# Patient Record
Sex: FEMALE | Race: WHITE | Employment: FULL TIME
[De-identification: names, ages, dates, MRNs, and addresses within clinical notes are randomized per-mention and may not be internally consistent; named-entity substitution may affect disease eponyms.]

---

## 2022-05-18 PROBLEM — F32.A ANXIETY AND DEPRESSION: Status: ACTIVE | Noted: 2022-05-18

## 2022-05-18 PROBLEM — E03.9 ACQUIRED HYPOTHYROIDISM: Status: ACTIVE | Noted: 2022-05-18

## 2022-05-18 PROBLEM — F41.9 ANXIETY AND DEPRESSION: Status: ACTIVE | Noted: 2022-05-18

## 2022-05-20 DIAGNOSIS — Z85.3 HISTORY OF BREAST CANCER: Primary | ICD-10-CM

## 2022-05-26 ENCOUNTER — TELEPHONE (OUTPATIENT)
Dept: PRIMARY CARE CLINIC | Facility: CLINIC | Age: 57
End: 2022-05-26

## 2022-05-26 NOTE — TELEPHONE ENCOUNTER
Please inform patient that all of her lab work came back normal. Continue current medications as it was already sent to the pharmacy with refills. Will follow up on the scheduled date for physical. Thank you.

## 2022-07-01 NOTE — PROGRESS NOTES
[address] [place] [address] Hematology and Oncology: New Patient - Consultation    Chief Complaint   Patient presents with    New Patient     Reason for Referral: History of breast cancer  Referring Provider: Brisa Lala DO  Family History of Cancer/Hematologic Disorders: None reported  Presenting Symptoms: No relevant physical symptoms reported     History of Present Illness:  Ms. Rajat Pierce is a 61 y.o. female who presents today in referral from Dr. Tye Garza for consultation regarding breast cancer. The past medical history is significant for hypothyroidism, depression/anxiety, fibroids, melanoma in 2012, and breast cancer. She established primary care with Dr. Brisa Lala on 5/18/22 after recently moving to [address] [place] [address] from [place], [place]. She requested a referral to oncology. Review of records indicates that patient was last seen by Oncologist, Dr. Jenna Crews, at [address] [place] [address] on 12/15/21. According to Dr. Carley Padilla notes, patient was diagnosed with a left breast cancer. She had palpated a lump to the left breast about 7 years prior, states that they were just monitoring it, and felt it was nothing to worry about. 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 She underwent lumpectomy with adjuvant radiation therapy, but she declined any further treatment with recommended Tamoxifen. In 2006, she again self-palpated a lump to the right breast. A 3.95 mm breast cancer, ER/NY+, HER-2/conner - was noted on pathology. She again underwent lumpectomy with adjuvant radiation therapy and five years of Tamoxifen.      Bilateral mammogram done 10/29/2015 noted mild diffuse dense breast parenchyma bilaterally. There was no evidence of any suspicious dominant mass lesion, spiculation and or microcalcifications. The retro-areolar and axillary portions of both breasts were unremarkable.  Patient stated that she had no issues for quite some time, until 2015, when she again self-palpated a lump in her right breast. Lumpectomy was done on 12/14/2018, and pathology noted invasive ductal carcinoma, 0.6 cm, grade 2, ER/ME +, HER2 -. She opted to procced with a skin sparing bilateral mastectomy, which was done 01/21/2016. The left breast noted fibrocystic changes, negative for atypia or malignancy. The right breast noting extensive previous biopsy site and reactive changes, negative for residual invasive carcinoma. Three lymph nodes were removed from the right axilla, and all three nodes were negative for carcinoma. Staging was [id number]. Oncotype DX was not done. BRCA testing was negative.      The patient did establish care with [address] [place] [address] and [address] [place] [address] on 11/11/2016. She was restarted on Tamoxifen, which was changed to [address] in February of 2018, due to menopause status. The patient states that she was on AI therapy for about 3 months, but she was unable to handle the side effects of the medication. She did try cutting the medication dose in half, which was better, but she still opted to discontinue the medication. She does however take a hormone balancing supplement with DIM. PET/CT scan done on 11/30/2016 noted a vague area of hypermetabolism within the right breast subareolar region with subtle soft tissue fullness when compared to the contralateral side; post-surgical changes of bilateral mastectomy, bilateral axillary lymph node dissection, and bilateral breast reconstruction; no suspicious hypermetabolic lymphadenopathy of the head and neck, chest, abdomen or pelvis; and no suspicious hypermetabolic osseous lesion. Bone density was performed on 03/14/2018, noting osteopenia. Patient does take daily calcium and vitamin D, but she has declined any bisphosphonate therapy.      She was seen by her oncologist with [facility] in October of 2018.  They discussed the continuation of AI therapy, along with consideration of bisphosphonate therapy. The patient was not interested in either of those recommendations and opted not to continue her care with this group. She was looking for a provider with a more holistic approach and was referred to Dr. Shon Aquino. She was evaluated in consultation by Dr. Shon Aquino on 4/2/19 to establish care as a new patient due to a history of breast cancer. Patient was interested in moving forward with the possibility of an alternative AI and was prescribed letrozole or exemestene. During follow-up appointment with Dr. Shon Aquino on 10/16/19, patient opted to proceed with letrozole 2.5mg po daily. She was also referred to a naturopathic oncology provider. Ultimately, patient did not end up starting the letrozole. She was last seen by Dr. Shon Aquino on 11/17/20. Patient has since relocated from [place] to [place] and is seeking to establish oncology care with [facility] for her history of breast cancer. Today, she is here for consultation. During today's visit we discussed the pathophysiology of breast cancer, staging, and the importance of receptor status in terms of treatment options. We then reviewed her medical history as well as oncologic history, recent imaging and pathology in detail. We discussed next steps in management. She is here with her . Family History   Problem Relation Age of Onset    Other Neg Hx     Post-op Nausea/Vomiting Neg Hx     Delayed Awakening Neg Hx     Pseudochol.  Deficiency Neg Hx     Malig Hypertherm Neg Hx     Colon Cancer Neg Hx     Ovarian Cancer Neg Hx     Breast Cancer Neg Hx     Emergence Delirium Neg Hx     Post-op Cognitive Dysfunction Neg Hx     Dementia Mother     Cancer Maternal Grandfather         lung    Cancer Other         cervical or uterine      Social History     Socioeconomic History    Marital status: Unknown     Spouse name: None    Number of children: None    Years of education: None    Highest education level: None   Occupational History    None   Tobacco Use    Smoking status: Never Smoker    Smokeless tobacco: Never Used   Substance and Sexual Activity    Alcohol use: Yes     Alcohol/week: 4.2 standard drinks    Drug use: No    Sexual activity: None     Comment: -vasectomy   Other Topics Concern    None   Social History Narrative    None     Social Determinants of Health     Financial Resource Strain:     Difficulty of Paying Living Expenses: Not on file   Food Insecurity:     Worried About Running Out of Food in the Last Year: Not on file    Bob of Food in the Last Year: Not on file   Transportation Needs:     Lack of Transportation (Medical): Not on file    Lack of Transportation (Non-Medical): Not on file   Physical Activity:     Days of Exercise per Week: Not on file    Minutes of Exercise per Session: Not on file   Stress:     Feeling of Stress : Not on file   Social Connections:     Frequency of Communication with Friends and Family: Not on file    Frequency of Social Gatherings with Friends and Family: Not on file    Attends Yarsanism Services: Not on file    Active Member of [address] [place] [address] or Organizations: Not on file    Attends Club or Organization Meetings: Not on file    Marital Status: Not on file   Intimate Partner Violence:     Fear of Current or Ex-Partner: Not on file    Emotionally Abused: Not on file    Physically Abused: Not on file    Sexually Abused: Not on file   Housing Stability:     Unable to Pay for Housing in the Last Year: Not on file    Number of [place] in the Last Year: Not on file    Unstable Housing in the Last Year: Not on file        Review of Systems   Constitutional: Negative for appetite change, chills, diaphoresis, fatigue, fever and unexpected weight change. HENT:   Negative for hearing loss, mouth sores, nosebleeds, sore throat, trouble swallowing and voice change. Eyes: Negative for icterus.    Respiratory: Negative for chest tightness, hemoptysis, shortness of breath and wheezing. Cardiovascular: Negative for chest pain, leg swelling and palpitations. Gastrointestinal: Negative for abdominal distention, abdominal pain, blood in stool, constipation, diarrhea, nausea and vomiting. Endocrine: Negative for hot flashes. Genitourinary: Negative for difficulty urinating, frequency, vaginal bleeding and vaginal discharge. Musculoskeletal: Negative for arthralgias, flank pain, gait problem and myalgias. Skin: Negative for itching, rash and wound. Neurological: Negative for dizziness, extremity weakness, gait problem, headaches and numbness. Psychiatric/Behavioral: Negative for confusion and depression. The patient is not nervous/anxious. No Known Allergies  Past Medical History:   Diagnosis Date    [facility]     Cancer ([facility] 2012    melanoma/early stage--back    Cancer [facility]) 2004 - 2006    breast cancer    Fibroids     Radiation therapy complication     Thyroid disease      Past Surgical History:   Procedure Laterality Date    BREAST LUMPECTOMY Bilateral     2004 AND 2006    BREAST RECONSTRUCTION  2016    bilateral breast reconstruction with implants     BREAST SURGERY  2004    biopsy    BREAST SURGERY  2004 and  2006    lumpectomy x 2    COLPOSCOPY  12/01/08    GYN  12/27/07    endometrial bx    GYN  01/21/10     D&C, cervical bx; visually directed bx's of endometrium    MASTECTOMY, BILATERAL  2016     Current Outpatient Medications   Medication Sig Dispense Refill    NP THYROID 60 MG tablet TAKE 1 TABLET BY MOUTH EVERY MORNING      venlafaxine (EFFEXOR) 37.5 MG tablet TAKE 1 TABLET BY MOUTH DAILY (AFTER BREAKFAST) FOR 90 DAYS. INDICATIONS: MAJOR DEPRESSIVE DISORDER      letrozole (FEMARA) 2.5 MG tablet Take 1 tablet by mouth daily 90 tablet 3     No current facility-administered medications for this visit. No flowsheet data found.     OBJECTIVE:  /66   Pulse (!) 120   Temp 98.4 °F (36.9 °C) (Oral)   Resp 16   Ht 5' 4.5\" (1.638 m)   Wt 116 lb 9.6 oz (52.9 kg)   SpO2 100%   BMI 19.71 kg/m²       ECOG PERFORMANCE STATUS - 0-Fully active, able to carry on all pre-disease performance without restriction. Pain - 0 - No pain/10. None/Minimal pain - not affecting QOL     Fatigue - No flowsheet data found. Distress - No flowsheet data found. Physical Exam  Vitals reviewed. Exam conducted with a chaperone present. Constitutional:       General: She is not in acute distress. Appearance: Normal appearance. She is normal weight. She is not ill-appearing or toxic-appearing. HENT:      Head: Normocephalic and atraumatic. Nose: Nose normal. No congestion. Mouth/Throat:      Mouth: Mucous membranes are moist.   Eyes:      General: No scleral icterus. Extraocular Movements: Extraocular movements intact. Conjunctiva/sclera: Conjunctivae normal.      Pupils: Pupils are equal, round, and reactive to light. Cardiovascular:      Rate and Rhythm: Normal rate and regular rhythm. Heart sounds: No murmur heard. Pulmonary:      Effort: Pulmonary effort is normal. No respiratory distress. Breath sounds: Normal breath sounds. No wheezing or rales. Chest:   Breasts:      Right: No axillary adenopathy or supraclavicular adenopathy. Left: No axillary adenopathy or supraclavicular adenopathy. Comments: S/p bilateral mastectomies with recon   No mass/lump   No axillary LAD bilaterally   Abdominal:      General: There is no distension. Palpations: Abdomen is soft. Tenderness: There is no abdominal tenderness. Musculoskeletal:         General: Normal range of motion. Cervical back: Normal range of motion. Right lower leg: No edema. Left lower leg: No edema. Lymphadenopathy:      Cervical: No cervical adenopathy. Upper Body:      Right upper body: No supraclavicular or axillary adenopathy.       Left upper body: No supraclavicular or axillary adenopathy. Skin:     General: Skin is warm and dry. Coloration: Skin is not jaundiced or pale. Findings: No rash. Neurological:      General: No focal deficit present. Mental Status: She is alert and oriented to person, place, and time. Gait: Gait normal.   Psychiatric:         Behavior: Behavior normal.         Thought Content: Thought content normal.          Labs:  No results found for this or any previous visit (from the past 168 hour(s)). Imaging: reviewed     [facility] records sent to scanning     ASSESSMENT:     Diagnosis Orders   1. Aromatase inhibitor use  DEXA BONE DENSITY AXIAL SKELETON   2. History of breast cancer  letrozole (FEMARA) 2.5 MG tablet    CBC with Auto Differential    Comprehensive Metabolic Panel       Ms. Heladio Gill is here for evaluation of breast cancer. 1. 2004 - 1.4cm, IDC, tubular type, YC38/AF74 pos, Her2 neg breast cancer - s/p lumpectomy, SLNx neg for malignancy, s/p XRT; rec Bae but pt elected not to proceed   2006 - right breast IDC 3.95mm, grade 2, ER3%/PR21%, her2 neg - [id number] - s/p lumpectomy and XRT; BRCA1/2 testing reportedly negative   2015 - IDC, 6mm, %/PR98%, Fli8Evd neg - pT1bN0 (0/3)Mx - OncotypeDx not done; s/p bilateral nipple sparing mastectomies   11/11/16 - started Bae   1/2018 - stopped Bae and started anastrozole   4/2018 - did not tolerate AI, declined alternate - decided on half dose anastrozole due to high risk   7/2022 - meeting me for 1st time; has not been on endo therapy     - During today's visit we discussed the pathophysiology of breast cancer, staging, and the importance of receptor status in terms of treatment options. We then reviewed her medical history as well as oncologic history, recent imaging and pathology in detail.  - discussed recs of endo therapy.     - AI can cause hot flashes, HTN, angina, swelling, fatigue, bone thinning leading to osteoporosis/fractures, joint stiffness, N/V, hair thinning, weight changes, thrombosis and worsening depression to name a few. Pt willing to try letrozole. - DEXA q2 years; discussed role of wt bearing exercise and bone health; pt not interested in bisphosphonate therapy - but we discussed options and indications today  - supplements reviewed - advised for pt to avoid current hormone balance supplement; [facility] herbs website shared with pt     RESUSCITATION DIRECTIVES/HOSPICE CARE: Full Support    RTC ~1mo for labs if chooses to take AI or sooner as needed   Can opt to come back for labs alone in 2mo or 6 mo FU with labs   [60min encounter - chart review, imaging review, consultation discussion, coordination of care and charting]    MDM  Number of Diagnoses or Management Options  Aromatase inhibitor use: new, no workup  History of breast cancer: established, improving     Amount and/or Complexity of Data Reviewed  Clinical lab tests: reviewed and ordered  Tests in the radiology section of CPT®: ordered and reviewed  Tests in the medicine section of CPT®: ordered  Obtain history from someone other than the patient: yes  Review and summarize past medical records: yes  Independent visualization of images, tracings, or specimens: yes    Risk of Complications, Morbidity, and/or Mortality  Presenting problems: moderate  Diagnostic procedures: moderate  Management options: high        Lab studies and imaging studies were personally reviewed. Pertinent old records were reviewed. All questions were asked and answered to the best of my ability. The patient verbalized understanding and agrees with the plan above. Documentation was sent to Dr. Charan Ng for continuation of care.   Thank you, Dr. Charan Ng, for the courtesy of this referral.        [facility], [address]  [address] [facility] [place] [address], [address] [place] [address]  Office : [phone]  Fax : [phone]

## 2022-07-06 ENCOUNTER — OFFICE VISIT (OUTPATIENT)
Dept: ONCOLOGY | Age: 57
End: 2022-07-06
Payer: COMMERCIAL

## 2022-07-06 VITALS
SYSTOLIC BLOOD PRESSURE: 113 MMHG | HEART RATE: 120 BPM | BODY MASS INDEX: 19.43 KG/M2 | RESPIRATION RATE: 16 BRPM | HEIGHT: 65 IN | OXYGEN SATURATION: 100 % | TEMPERATURE: 98.4 F | WEIGHT: 116.6 LBS | DIASTOLIC BLOOD PRESSURE: 66 MMHG

## 2022-07-06 DIAGNOSIS — Z79.811 AROMATASE INHIBITOR USE: Primary | ICD-10-CM

## 2022-07-06 DIAGNOSIS — Z85.3 HISTORY OF BREAST CANCER: ICD-10-CM

## 2022-07-06 PROCEDURE — 99205 OFFICE O/P NEW HI 60 MIN: CPT | Performed by: INTERNAL MEDICINE

## 2022-07-06 PROCEDURE — G8427 DOCREV CUR MEDS BY ELIG CLIN: HCPCS | Performed by: INTERNAL MEDICINE

## 2022-07-06 PROCEDURE — G8420 CALC BMI NORM PARAMETERS: HCPCS | Performed by: INTERNAL MEDICINE

## 2022-07-06 PROCEDURE — 3017F COLORECTAL CA SCREEN DOC REV: CPT | Performed by: INTERNAL MEDICINE

## 2022-07-06 PROCEDURE — 1036F TOBACCO NON-USER: CPT | Performed by: INTERNAL MEDICINE

## 2022-07-06 RX ORDER — LEVOTHYROXINE, LIOTHYRONINE 38; 9 UG/1; UG/1
TABLET ORAL
COMMUNITY
Start: 2022-05-29

## 2022-07-06 RX ORDER — LETROZOLE 2.5 MG/1
2.5 TABLET, FILM COATED ORAL DAILY
Qty: 90 TABLET | Refills: 3 | Status: SHIPPED | OUTPATIENT
Start: 2022-07-06

## 2022-07-06 RX ORDER — VENLAFAXINE 37.5 MG/1
TABLET ORAL
COMMUNITY
Start: 2022-05-18 | End: 2022-07-29

## 2022-07-06 ASSESSMENT — PATIENT HEALTH QUESTIONNAIRE - PHQ9
3. TROUBLE FALLING OR STAYING ASLEEP: 0
SUM OF ALL RESPONSES TO PHQ QUESTIONS 1-9: 0
2. FEELING DOWN, DEPRESSED OR HOPELESS: 0
9. THOUGHTS THAT YOU WOULD BE BETTER OFF DEAD, OR OF HURTING YOURSELF: 0
SUM OF ALL RESPONSES TO PHQ QUESTIONS 1-9: 0
SUM OF ALL RESPONSES TO PHQ9 QUESTIONS 1 & 2: 0
8. MOVING OR SPEAKING SO SLOWLY THAT OTHER PEOPLE COULD HAVE NOTICED. OR THE OPPOSITE, BEING SO FIGETY OR RESTLESS THAT YOU HAVE BEEN MOVING AROUND A LOT MORE THAN USUAL: 0
SUM OF ALL RESPONSES TO PHQ QUESTIONS 1-9: 0
6. FEELING BAD ABOUT YOURSELF - OR THAT YOU ARE A FAILURE OR HAVE LET YOURSELF OR YOUR FAMILY DOWN: 0
1. LITTLE INTEREST OR PLEASURE IN DOING THINGS: 0
4. FEELING TIRED OR HAVING LITTLE ENERGY: 0
SUM OF ALL RESPONSES TO PHQ QUESTIONS 1-9: 0
5. POOR APPETITE OR OVEREATING: 0
7. TROUBLE CONCENTRATING ON THINGS, SUCH AS READING THE NEWSPAPER OR WATCHING TELEVISION: 0

## 2022-07-06 NOTE — PATIENT INSTRUCTIONS
Patient Instructions from Today's Visit    Reason for Visit:  New patient for history of  Breast cancer. Diagnosis Information:  https://www.[employer]/. net/about-us/asco-answers-patient-education-materials/tkpw-wctcvpj-dmhi-sheets  Patient was educated and given handouts published by ASCO entitled ASCO Answers Fact Sheets about their diagnosis of breast cancer during todays office visit. Plan:  Discussed breast cancer history and antihormonal therapy. Due for bone density scan. Can call [phone] to schedule at your convenience. Will start letrozole and see how you tolerate. Discussed blood pressure monitoring, let us know if consistently elevated. Follow Up:  Labs only in a couple months. Office visit 2 months with labs. Can change to 6 months if you do not take the letrozole. Recent Lab Results:  n/a      Treatment Summary has been discussed and given to patient: n/a        -------------------------------------------------------------------------------------------------------------------  Please call our office at [phone] if you have any  of the following symptoms:   · Fever of 100.5 or greater  · Chills  · Shortness of breath  · Swelling or pain in one leg    After office hours an answering service is available and will contact a provider for emergencies or if you are experiencing any of the above symptoms.  Patient did express an interest in My Chart. My Chart log in information explained on the after visit summary printout at the [facility] Kyleigh [address] desk. Araceli Conde RN            ASCO ANSWERS is a collection of oncologist-approved patient education materials developed by the [employer] of Clinical Oncology (ASCO) for people with cancer and their caregivers. Breast Cancer    What is breast cancer? Breast cancer begins when healthy breast cells change and grow out of control, usually forming a mass called a tumor.  Breast cancer is the most common type of cancer diagnosed in women in the [facility] (excluding skin cancer). What are the parts of the breast?   Most of the breast is fatty tissue. However, it also contains a network of lobes that are made up of tiny, tube-like structures called lobules that contain milk glands. Tiny ducts connect the glands, lobules, and lobes, and carry milk from the lobes to the nipple. Most breast cancers begin in the cells lining the milk ducts and are called ductal carcinomas. The second most common type starts in the lobules and is called lobular carcinoma. What does stage mean? The stage is a way of describing where the cancer is located, how much the cancer has grown, and if or where it has spread. There are 5 stages for breast cancer: stage 0 (zero), which is called noninvasive cancer or ductal carcinoma in situ (DCIS), and stages I through IV (1 through 4). Descriptions and illustrations of these stages are available at www.cancer. net/breast.     How is breast cancer treated? The biology and behavior of a breast cancer affect the treatment plan, and every persons cancer is different. Doctors consider many factors when recommending a treatment plan, including the cancers stage; the tumors human epidermal growth factor receptor 2 (HER2) status and the hormone receptor status, which includes estrogen receptors (ER) and progesterone receptors (UT); the presence of known mutations (changes) in breast cancer genes; and the womans age, general health, and whether she has experienced menopause. For earlier stages of cancer, surgery to remove the tumor and nearby lymph nodes usually is the first treatment. Additional treatment with chemotherapy, radiation therapy, hormonal therapy, or targeted therapy is usually given after surgery to lower the risk of the cancer returning. These treatments may also be given before surgery to shrink the size of the tumor.  The treatment of cancer that has spread or come back after treatment depends on many factors. It can include the therapies listed above used in a different combination or at a different pace. When making treatment decisions, women may also consider a clinical trial; talk with your doctor about all treatment options. The side effects of breast cancer treatment can be reduced or managed with a variety of medications and the help of your health care team. This is called palliative care and is an important part of the overall treatment plan. How can I cope with breast cancer? Absorbing the news of a cancer diagnosis and communicating with your health care team are key parts of the coping process. Seeking support, organizing your health information, making sure all of your questions are answered, and participating in the decision-making process are other steps. Talk with your health care team about any concerns. Understanding your emotions and those of people close to you can be helpful in managing the diagnosis, treatment, and healing process. [address]. © 2004 AMERICAN SOCIETY OF CLINICAL ONCOLOGY. MADE AVAILABLE THROUGH   Questions to ask the health care team   Regular communication is important in making informed decisions about your health care. Consider asking the following questions of your health care team:    What type of breast cancer do I have? Can you explain my pathology report (laboratory test results) to me? What stage is the breast cancer? What does this mean? What is the ER/AR status of the tumor? The HER2 status? What does this                       mean? Would you explain my treatment options? What clinical trials are available for me? Where are they located, and how do I                 find out more about them? What treatment plan do you recommend? Why? Should treatment before surgery be considered? What is the goal of each treatment?  Is it to eliminate the cancer, help me feel better, or both? Who will be part of my treatment team, and what does each member do? How will this treatment affect my daily life? Will I be able to work, exercise, and                perform my usual activities? Will this treatment affect my ability to become pregnant or have children? What                can be done to preserve my fertility? What long-term side effects are associated with my cancer treatment? If Im worried about managing the costs of cancer care, who can help me? Where can I find emotional support for me and my family? Whom should I call with questions or problems? Is there anything else I should be asking? Find more questions to ask the health care team at [address]. net/breast and www.cancer. net/metastaticbreast. For a digital list of questions, download Cancer. Nets free mobile jewell at www.cancer. net/jewell. The ideas and opinions expressed here do not necessarily reflect the opinions of the American Society of Clinical Oncology (ASCO) or [employer]. The information in this fact sheet is not intended as medical or legal advice, or as a substitute for consultation with a physician or other licensed health care provider. Patients with health care-related questions should call or see their physician or other health care provider promptly and should not disregard professional medical advice, or delay seeking it, because of information encountered here. The mention of any product, service, or treatment in this fact sheet should not be construed as an ASCO endorsement. ASCO is not responsible for any injury or damage to persons or property arising out of or related to any use of ASCOs patient education materials, or to any errors or omissions. To order more printed copies, please call [phone] or visit www.cancer. net/estore.     TERMS TO KNOW     Benign: A growth that is not cancerous     Biopsy: Removal of a small tissue sample that is examined under a microscope to check for cancer cells     Chemotherapy: The use of drugs to destroy cancer cells     DCIS: Ductal carcinoma in situ; cancer that has not spread past the ducts and is not invasive     Lymph node: A tiny, bean-shaped organ that fights infection     Lumpectomy: The surgical removal of the tumor and an area of healthy tissue around the tumor     Malignant: A cancerous growth or mass     Mastectomy: Surgical removal of the entire breast     Metastasis: The spread of cancer to another part of the body, usually to another organ     Oncologist: A doctor who specializes in treating cancer     Radiation therapy: The use of high-energy x-rays to destroy cancer cells     Tumor: An abnormal growth of body tissue     [address] [facility], [address] [facility], [address] [facility]  Toll Free: [phone]  Phone: [phone] www. [employer]  www.[employer]. [employer] © 2017 American Society of Clinical Oncology. For permissions information, contact [email or web address]   [email or web address]             letrozole  Pronunciation: LET qing zol  Brand: Femara  What is the most important information I should know about letrozole? You should not use letrozole if you are pregnant. What is letrozole? Letrozole lowers estrogen levels in postmenopausal women, which may slow thegrowth of certain types of breast tumors that need estrogen to grow in the body. Letrozole is used to treat breast cancer in postmenopausal women. It is oftengiven to women who have been taking tamoxifen (Nolvadex, Soltamox) for 5 years. Letrozole may also be used for purposes not listed in this medication guide. What should I discuss with my healthcare provider before taking letrozole? You should not use letrozole if you are allergic to it. This medicine is for use only in women who can no longer get pregnant. Letrozole can harm an unborn baby. Do not use if you are pregnant.  Use effective birth control if you are not past menopause. Keep using birth control for at least 3 weeks after your last dose of letrozole. Tell yourdoctor if you think you may be pregnant. Tell your doctor if you have ever had:   liver disease (especially cirrhosis);   osteoporosis, osteopenia (low bone mineral density);   high cholesterol; or   if you also take tamoxifen. You should not breastfeed while you are using letrozole and for at least 3weeks after your last dose. How should I take letrozole? Follow all directions on your prescription label and read all medication guidesor instruction sheets. Use the medicine exactly as directed. You may take letrozole with or without food. You will need frequent medical tests, and your bone mineral density may alsoneed to be checked. Store at room temperature away from moisture and heat. What happens if I miss a dose? Take the medicine as soon as you can, but skip the missed dose if it is almost time for your next dose. Do not take two doses at one time. What happens if I overdose? Seek emergency medical attention or call the Poison Help line at 1-300.331.1752. What should I avoid while taking letrozole? Avoid driving or hazardous activity until you know how this medicine willaffect you. Your reactions could be impaired. What are the possible side effects of letrozole? Get emergency medical help if you have signs of an allergic reaction: hives; difficult breathing; swelling of your face, lips, tongue, or throat. Common side effects may include:   hot flashes, warmth or redness in your face or chest;   headache, dizziness, weakness;   bone pain, muscle or joint pain;   swelling, weight gain;   increased sweating; or   increased cholesterol in your blood. This is not a complete list of side effects and others may occur. Call your doctor for medical advice about side effects. You may report side effects toFDA at [phone]-4077.   What other drugs will affect letrozole? Other drugs may affect letrozole, including prescription and over-the-counter medicines, vitamins, and herbal products. Tell your doctor about all yourcurrent medicines and any medicine you start or stop using. Where can I get more information? Your pharmacist can provide more information about letrozole. Remember, keep this and all other medicines out of the reach of children, never share your medicines with others, and use this medication only for the indication prescribed. Every effort has been made to ensure that the information provided by [address] is accurate, up-to-date, and complete, but no guarantee is made to that effect. Drug information contained herein may be time sensitive. [facility] information has been compiled for use by healthcare practitioners and consumers in the [place] and therefore [facility] does not warrant that uses outside of the [place] are appropriate, unless specifically indicated otherwise. [facility]'s drug information does not endorse drugs, diagnose patients or recommend therapy. [facility] drug information is an informational resource designed to assist licensed healthcare practitioners in caring for their patients and/or to serve consumers viewing this service as a supplement to, and not a substitute for, the expertise, skill, knowledge and judgment of healthcare practitioners. The absence of a warning for a given drug or drug combination in no way should be construed to indicate that the drug or drug combination is safe, effective or appropriate for any given patient. [facility] does not assume any responsibility for any aspect of healthcare administered with the aid of information [facility] provides. The information contained herein is not intended to cover all possible uses, directions, precautions, warnings, drug interactions, allergic reactions, or adverse effects.  If you have questions about the drugs you are taking, check with yourdoctor, nurse or pharmacist.  Copyright 0254-1980 [facility] Version: 8.02. Revision date: 5/14/2020. Care instructions adapted under license by [facility] ([facility]). If you have questions about a medical condition or this instruction, always ask your healthcare professional. [address] any warranty or liability for your use of this information.

## 2022-07-07 ASSESSMENT — ENCOUNTER SYMPTOMS
WHEEZING: 0
TROUBLE SWALLOWING: 0
SORE THROAT: 0
CONSTIPATION: 0
HEMOPTYSIS: 0
VOMITING: 0
BLOOD IN STOOL: 0
SCLERAL ICTERUS: 0
DIARRHEA: 0
NAUSEA: 0
ABDOMINAL PAIN: 0
SHORTNESS OF BREATH: 0
ABDOMINAL DISTENTION: 0
CHEST TIGHTNESS: 0
VOICE CHANGE: 0

## 2022-07-18 ENCOUNTER — PATIENT MESSAGE (OUTPATIENT)
Dept: PRIMARY CARE CLINIC | Facility: CLINIC | Age: 57
End: 2022-07-18

## 2022-07-28 DIAGNOSIS — F41.9 ANXIETY AND DEPRESSION: Primary | ICD-10-CM

## 2022-07-28 DIAGNOSIS — F32.A ANXIETY AND DEPRESSION: Primary | ICD-10-CM

## 2022-07-28 RX ORDER — VENLAFAXINE HYDROCHLORIDE 37.5 MG/1
37.5 CAPSULE, EXTENDED RELEASE ORAL DAILY
Qty: 30 CAPSULE | Refills: 0 | Status: SHIPPED | OUTPATIENT
Start: 2022-07-28 | End: 2022-07-29 | Stop reason: SDUPTHER

## 2022-07-28 NOTE — TELEPHONE ENCOUNTER
From: Breanne Das  To: Dr. Genaro Gaffney: 7/18/2022 9:14 AM EDT  Subject: Venlafaxime Capsules instead of Tablets    Good Morning Dr. FRANCO,    Please modify my prescription:   I need the Venlafaxine Hydrochloride Extended-release Capsules 37.5mg instead of the Venlafaxine HCL 37.5 mg tablets (that you prescribed. I have been taking the Capsules for over 10 years and would not like to switch to a different form of pill. My pharmacy is [facility][1]. Thank you Dr. Yuki Castro. Have a blest day.  Vernida Pellet

## 2022-07-29 DIAGNOSIS — F32.A ANXIETY AND DEPRESSION: ICD-10-CM

## 2022-07-29 DIAGNOSIS — F41.9 ANXIETY AND DEPRESSION: ICD-10-CM

## 2022-07-29 RX ORDER — VENLAFAXINE HYDROCHLORIDE 37.5 MG/1
37.5 CAPSULE, EXTENDED RELEASE ORAL DAILY
Qty: 90 CAPSULE | Refills: 2 | Status: SHIPPED | OUTPATIENT
Start: 2022-07-29 | End: 2022-11-02

## 2022-08-04 NOTE — TELEPHONE ENCOUNTER
Please review patient's new message from yesterday regarding RX for Rizatriptan 10 mg, please advise. Thank you!

## 2022-08-05 RX ORDER — RIZATRIPTAN BENZOATE 10 MG/1
10 TABLET ORAL
Qty: 9 TABLET | Refills: 3 | Status: SHIPPED | OUTPATIENT
Start: 2022-08-05 | End: 2022-11-02

## 2022-08-05 NOTE — TELEPHONE ENCOUNTER
I have sent the rx and only 9 pills can be filled a month. It is not recommended to use more than that, and it is once a day as needed. If headaches are getting worse and medication is not helping, then needs to be seen in office. Please inform patient. Thank you.

## 2022-08-29 ENCOUNTER — TELEPHONE (OUTPATIENT)
Dept: ONCOLOGY | Age: 57
End: 2022-08-29

## 2022-09-27 ENCOUNTER — HOSPITAL ENCOUNTER (OUTPATIENT)
Dept: MAMMOGRAPHY | Age: 57
Discharge: HOME | End: 2022-09-30
Payer: COMMERCIAL

## 2022-09-27 DIAGNOSIS — Z79.811 AROMATASE INHIBITOR USE: ICD-10-CM

## 2022-09-27 PROCEDURE — 77080 DXA BONE DENSITY AXIAL: CPT

## 2022-10-31 ASSESSMENT — ENCOUNTER SYMPTOMS
WHEEZING: 0
TROUBLE SWALLOWING: 0
CONSTIPATION: 0
ABDOMINAL DISTENTION: 0
DIARRHEA: 0
VOICE CHANGE: 0
CHEST TIGHTNESS: 0
BLOOD IN STOOL: 0
SORE THROAT: 0
NAUSEA: 0
SHORTNESS OF BREATH: 0
ABDOMINAL PAIN: 0
HEMOPTYSIS: 0
VOMITING: 0
SCLERAL ICTERUS: 0

## 2022-10-31 NOTE — PROGRESS NOTES
[facility] Hematology and Oncology: Established patient - follow up     Chief Complaint   Patient presents with    Follow-up     Reason for Referral: History of breast cancer  Referring Provider: Migdalia Dodd DO  Family History of Cancer/Hematologic Disorders: None reported  Presenting Symptoms: No relevant physical symptoms reported     History of Present Illness:  Ms. Jose Juan Dowd is a 61 y.o. female who presents today in referral from Dr. Tiara Cortez for consultation regarding breast cancer. The past medical history is significant for hypothyroidism, depression/anxiety, fibroids, melanoma in 2012, and breast cancer. She established primary care with Dr. Migdalia Dodd on 5/18/22 after recently moving to [place] from [place], [place]. She requested a referral to oncology. Review of records indicates that patient was last seen by Oncologist, Dr. Charlie Rodriguez, at [address] [place] [address] on 12/15/21. According to Dr. Matthew Mancilla notes, patient was diagnosed with a left breast cancer. She had palpated a lump to the left breast about 7 years prior, states that they were just monitoring it, and felt it was nothing to worry about. 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 She underwent lumpectomy with adjuvant radiation therapy, but she declined any further treatment with recommended Tamoxifen. In 2006, she again self-palpated a lump to the right breast. A 3.95 mm breast cancer, ER/CA+, HER-2/conner - was noted on pathology. She again underwent lumpectomy with adjuvant radiation therapy and five years of Tamoxifen. Bilateral mammogram done 10/29/2015 noted mild diffuse dense breast parenchyma bilaterally. There was no evidence of any suspicious dominant mass lesion, spiculation and or microcalcifications. The retro-areolar and axillary portions of both breasts were unremarkable.  Patient stated that she had no issues for quite some time, until 2015, when she again self-palpated a lump in her right breast. Lumpectomy was done on 12/14/2018, and pathology noted invasive ductal carcinoma, 0.6 cm, grade 2, ER/NC +, HER2 -. She opted to procced with a skin sparing bilateral mastectomy, which was done 01/21/2016. The left breast noted fibrocystic changes, negative for atypia or malignancy. The right breast noting extensive previous biopsy site and reactive changes, negative for residual invasive carcinoma. Three lymph nodes were removed from the right axilla, and all three nodes were negative for carcinoma. Staging was [id number]. Oncotype DX was not done. BRCA testing was negative. The patient did establish care with [facility] and [address] [place] [address] on 11/11/2016. She was restarted on Tamoxifen, which was changed to [address] in February of 2018, due to menopause status. The patient states that she was on AI therapy for about 3 months, but she was unable to handle the side effects of the medication. She did try cutting the medication dose in half, which was better, but she still opted to discontinue the medication. She does however take a hormone balancing supplement with DIM. PET/CT scan done on 11/30/2016 noted a vague area of hypermetabolism within the right breast subareolar region with subtle soft tissue fullness when compared to the contralateral side; post-surgical changes of bilateral mastectomy, bilateral axillary lymph node dissection, and bilateral breast reconstruction; no suspicious hypermetabolic lymphadenopathy of the head and neck, chest, abdomen or pelvis; and no suspicious hypermetabolic osseous lesion. Bone density was performed on 03/14/2018, noting osteopenia. Patient does take daily calcium and vitamin D, but she has declined any bisphosphonate therapy. She was seen by her oncologist with [facility] in October of 2018.  They discussed the continuation of AI therapy, along with consideration of bisphosphonate therapy. The patient was not interested in either of those recommendations and opted not to continue her care with this group. She was looking for a provider with a more holistic approach and was referred to Dr. Nicolas Randolph. She was evaluated in consultation by Dr. Nicolas Randolph on 4/2/19 to establish care as a new patient due to a history of breast cancer. Patient was interested in moving forward with the possibility of an alternative AI and was prescribed letrozole or exemestene. During follow-up appointment with Dr. Nicolas Randolph on 10/16/19, patient opted to proceed with letrozole 2.5mg po daily. She was also referred to a naturopathic oncology provider. Ultimately, patient did not end up starting the letrozole. She was last seen by Dr. Nicolas Randolph on 11/17/20. Patient has since relocated from [place] to [address] [place] [address] and is seeking to establish oncology care with [facility] for her history of breast cancer. During consultation, we discussed the pathophysiology of breast cancer, staging, and the importance of receptor status in terms of treatment options. We then reviewed her medical history as well as oncologic history, recent imaging and pathology in detail. We discussed next steps in management. She is here with her . Today, she is here for Fu. Tolerating letrozole very well. She started it after bone density so has been on it for about a month. Bone density reviewed and she will remain on vitamin D/calcium. For quite some time, she has been experiencing vaginal dryness and we reviewed different ways to ameliorate the symptom. She will start Reveree vaginal moisturizer. We discussed the role of vaginal dilation and I provided her resources to investigate this further. Additional tips reviewed. Labs reviewed and look great. Of note, her sister who is older than 61 was recently diagnosed with advanced ovarian cancer.   Patient had prior genetic testing and was told she was BRCA negative. We will send a note to Litzy Guzman to determine if patient meets criteria for additional genetic testing. Family History   Problem Relation Age of Onset    Other Neg Hx     Post-op Nausea/Vomiting Neg Hx     Delayed Awakening Neg Hx     Pseudochol. Deficiency Neg Hx     Malig Hypertherm Neg Hx     Colon Cancer Neg Hx     Ovarian Cancer Neg Hx     Breast Cancer Neg Hx     Emergence Delirium Neg Hx     Post-op Cognitive Dysfunction Neg Hx     Dementia Mother     Cancer Maternal Grandfather         lung    Cancer Other         cervical or uterine      Social History     Socioeconomic History    Marital status: Unknown     Spouse name: None    Number of children: None    Years of education: None    Highest education level: None   Tobacco Use    Smoking status: Never    Smokeless tobacco: Never   Substance and Sexual Activity    Alcohol use: Yes     Alcohol/week: 4.2 standard drinks    Drug use: No        Review of Systems   Constitutional:  Negative for appetite change, chills, diaphoresis, fatigue, fever and unexpected weight change. HENT:   Negative for hearing loss, mouth sores, nosebleeds, sore throat, trouble swallowing and voice change. Eyes:  Negative for icterus. Respiratory:  Negative for chest tightness, hemoptysis, shortness of breath and wheezing. Cardiovascular:  Negative for chest pain, leg swelling and palpitations. Gastrointestinal:  Negative for abdominal distention, abdominal pain, blood in stool, constipation, diarrhea, nausea and vomiting. Endocrine: Negative for hot flashes. Genitourinary:  Positive for dyspareunia. Negative for difficulty urinating, frequency, vaginal bleeding and vaginal discharge. Musculoskeletal:  Positive for arthralgias. Negative for flank pain, gait problem and myalgias. Skin:  Negative for itching, rash and wound. Neurological:  Negative for dizziness, extremity weakness, gait problem, headaches and numbness. Psychiatric/Behavioral:  Negative for confusion and depression. The patient is not nervous/anxious. No Known Allergies  Past Medical History:   Diagnosis Date    Breast cancer ([facility] [address]     Cancer ([facility] [address] 2012    melanoma/early stage--back    Cancer [facility]) 2004 - 2006    breast cancer    Fibroids     Radiation therapy complication     Thyroid disease      Past Surgical History:   Procedure Laterality Date    BREAST LUMPECTOMY Bilateral     2004 AND 2006    BREAST RECONSTRUCTION  2016    bilateral breast reconstruction with implants     BREAST SURGERY  2004    biopsy    BREAST SURGERY  2004 and  2006    lumpectomy x 2    COLPOSCOPY  12/01/08    GYN  12/27/07    endometrial bx    GYN  01/21/10     D&C, cervical bx; visually directed bx's of endometrium    MASTECTOMY, BILATERAL  2016     Current Outpatient Medications   Medication Sig Dispense Refill    rizatriptan (MAXALT) 10 MG tablet Take 1 tablet by mouth once as needed for Migraine May repeat in 2 hours if needed 9 tablet 3    venlafaxine (EFFEXOR XR) 37.5 MG extended release capsule Take 1 capsule by mouth in the morning. 90 capsule 2    NP THYROID 60 MG tablet TAKE 1 TABLET BY MOUTH EVERY MORNING      letrozole (FEMARA) 2.5 MG tablet Take 1 tablet by mouth daily 90 tablet 3     No current facility-administered medications for this visit. No flowsheet data found. OBJECTIVE:  /80 (Site: Right Upper Arm, Position: Standing)   Pulse (!) 105   Temp 98.4 °F (36.9 °C)   Resp 12   Ht 5' 4.5\" (1.638 m)   Wt 117 lb 9.6 oz (53.3 kg)   SpO2 99%   BMI 19.87 kg/m²       ECOG PERFORMANCE STATUS - 0-Fully active, able to carry on all pre-disease performance without restriction. Pain - 0 - No pain/10. None/Minimal pain - not affecting QOL     Fatigue - No flowsheet data found. Distress - No flowsheet data found. Physical Exam  Vitals reviewed. Exam conducted with a chaperone present.    Constitutional:       General: She is not in acute distress. Appearance: Normal appearance. She is normal weight. She is not ill-appearing or toxic-appearing. HENT:      Head: Normocephalic and atraumatic. Nose: Nose normal. No congestion. Mouth/Throat:      Mouth: Mucous membranes are moist.   Eyes:      General: No scleral icterus. Extraocular Movements: Extraocular movements intact. Conjunctiva/sclera: Conjunctivae normal.      Pupils: Pupils are equal, round, and reactive to light. Cardiovascular:      Rate and Rhythm: Normal rate and regular rhythm. Heart sounds: No murmur heard. Pulmonary:      Effort: Pulmonary effort is normal. No respiratory distress. Breath sounds: Normal breath sounds. No wheezing or rales. Chest:          Comments: S/p bilateral mastectomies with recon   No mass/lump   No axillary LAD bilaterally   Abdominal:      General: There is no distension. Palpations: Abdomen is soft. Tenderness: There is no abdominal tenderness. Musculoskeletal:         General: Normal range of motion. Cervical back: Normal range of motion. Right lower leg: No edema. Left lower leg: No edema. Lymphadenopathy:      Cervical: No cervical adenopathy. Upper Body:      Right upper body: No supraclavicular or axillary adenopathy. Left upper body: No supraclavicular or axillary adenopathy. Skin:     General: Skin is warm and dry. Coloration: Skin is not jaundiced or pale. Findings: No rash. Neurological:      General: No focal deficit present. Mental Status: She is alert and oriented to person, place, and time. Gait: Gait normal.   Psychiatric:         Behavior: Behavior normal.         Thought Content:  Thought content normal.        Labs:  Recent Results (from the past 168 hour(s))   CBC with Auto Differential    Collection Time: 11/02/22  9:37 AM   Result Value Ref Range    WBC 4.3 4.3 - 11.1 K/uL    RBC 4.38 4.05 - 5.2 M/uL    Hemoglobin 13.2 11.7 - 15.4 g/dL Hematocrit 40.4 35.8 - 46.3 %    MCV 92.2 82.0 - 102.0 FL    MCH 30.1 26.1 - 32.9 PG    MCHC 32.7 31.4 - 35.0 g/dL    RDW 12.4 11.9 - 14.6 %    Platelets 673 913 - 842 K/uL    MPV 9.0 (L) 9.4 - 12.3 FL    nRBC 0.00 0.0 - 0.2 K/uL    Differential Type AUTOMATED      Seg Neutrophils 56 43 - 78 %    Lymphocytes 31 13 - 44 %    Monocytes 11 4.0 - 12.0 %    Eosinophils % 1 0.5 - 7.8 %    Basophils 1 0.0 - 2.0 %    Immature Granulocytes 0 0.0 - 5.0 %    Segs Absolute 2.4 1.7 - 8.2 K/UL    Absolute Lymph # 1.3 0.5 - 4.6 K/UL    Absolute Mono # 0.5 0.1 - 1.3 K/UL    Absolute Eos # 0.0 0.0 - 0.8 K/UL    Basophils Absolute 0.1 0.0 - 0.2 K/UL    Absolute Immature Granulocyte 0.0 0.0 - 0.5 K/UL   Comprehensive Metabolic Panel    Collection Time: 11/02/22  9:37 AM   Result Value Ref Range    Sodium 139 133 - 143 mmol/L    Potassium 4.1 3.5 - 5.1 mmol/L    Chloride 106 101 - 110 mmol/L    CO2 27 21 - 32 mmol/L    Anion Gap 6 2 - 11 mmol/L    Glucose 91 65 - 100 mg/dL    BUN 11 6 - 23 MG/DL    Creatinine 0.70 0.6 - 1.0 MG/DL    Est, Glom Filt Rate >60 >60 ml/min/1.73m2    Calcium 9.8 8.3 - 10.4 MG/DL    Total Bilirubin 0.7 0.2 - 1.1 MG/DL    ALT 16 12 - 65 U/L    AST 22 15 - 37 U/L    Alk Phosphatase 54 50 - 136 U/L    Total Protein 7.1 6.3 - 8.2 g/dL    Albumin 4.1 3.5 - 5.0 g/dL    Globulin 3.0 2.8 - 4.5 g/dL    Albumin/Globulin Ratio 1.4 0.4 - 1.6         Imaging: reviewed     [facility] records sent to scanning     ASSESSMENT:     Diagnosis Orders   1. History of breast cancer        2. Hx of melanoma of skin        3. Aromatase inhibitor use        4. At risk for bone density loss        5. Dyspareunia in female              MsVeronica Oviedo is here for Fu  of breast cancer.       1. 2004 - 1.4cm, IDC, tubular type, ZI61/TK57 pos, Her2 neg breast cancer - s/p lumpectomy, SLNx neg for malignancy, s/p XRT; rec Bae but pt elected not to proceed   2006 - right breast IDC 3.95mm, grade 2, ER3%/PR21%, her2 neg - [id number] - s/p lumpectomy and XRT; BRCA1/2 testing reportedly negative   2015 - IDC, 6mm, %/PR98%, Smc8Odh neg - pT1bN0 (0/3)Mx - OncotypeDx not done; s/p bilateral nipple sparing mastectomies   11/11/16 - started Bae   1/2018 - stopped Bae and started anastrozole   4/2018 - did not tolerate AI, declined alternate - decided on half dose anastrozole due to high risk   7/2022 - meeting me for 1st time; has not been on endo therapy     - here for Fu. Tolerating letrozole very well. She started it after bone density so has been on it for about a month. - bone loss - Bone density reviewed and she will remain on vitamin D/calcium. - vaginal dryness/atrophy - For quite some time, she has been experiencing vaginal dryness and we reviewed different ways to ameliorate the symptom. She will start Reveree vaginal moisturizer. We discussed the role of vaginal dilation and I provided her resources to investigate this further. Additional tips reviewed. - genetics - her sister who is older than 61 was recently diagnosed with advanced ovarian cancer. Patient had prior genetic testing and was told she was BRCA negative.   We will send a note to Sadie Echavarria to determine if patient meets criteria for additional genetic testing.  - supplements reviewed - advised for pt to avoid current hormone balance supplement; [facility] herbs website shared with pt     RESUSCITATION DIRECTIVES/HOSPICE CARE: Full Support    RTC 3-4mo or sooner as needed     MDM  Number of Diagnoses or Management Options  Aromatase inhibitor use: established, improving  At risk for bone density loss: new, needed workup  Dyspareunia in female: new, no workup  History of breast cancer: established, improving     Amount and/or Complexity of Data Reviewed  Clinical lab tests: ordered and reviewed  Tests in the medicine section of CPT®: ordered  Review and summarize past medical records: yes  Independent visualization of images, tracings, or specimens: yes    Risk of Complications, Morbidity, and/or Mortality  Presenting problems: moderate  Diagnostic procedures: moderate  Management options: moderate        Lab studies and imaging studies were personally reviewed. Pertinent old records were reviewed. Historical:   - we discussed the pathophysiology of breast cancer, staging, and the importance of receptor status in terms of treatment options. We then reviewed her medical history as well as oncologic history, recent imaging and pathology in detail.  - discussed recs of endo therapy. - AI can cause hot flashes, HTN, angina, swelling, fatigue, bone thinning leading to osteoporosis/fractures, joint stiffness, N/V, hair thinning, weight changes, thrombosis and worsening depression to name a few. Pt willing to try letrozole. - DEXA q2 years; discussed role of wt bearing exercise and bone health; pt not interested in bisphosphonate therapy - but we discussed options and indications today    All questions were asked and answered to the best of my ability. The patient verbalized understanding and agrees with the plan above.             Baltazar Melendez Precise) Annabella Lezama, [address] [facility] Hematology and Oncology  [address] [place] [address]  Office : [phone]  Fax : [phone]

## 2022-11-02 ENCOUNTER — HOSPITAL ENCOUNTER (OUTPATIENT)
Dept: LAB | Age: 57
Discharge: HOME | End: 2022-11-05
Payer: COMMERCIAL

## 2022-11-02 ENCOUNTER — OFFICE VISIT (OUTPATIENT)
Dept: ONCOLOGY | Age: 57
End: 2022-11-02
Payer: COMMERCIAL

## 2022-11-02 VITALS
RESPIRATION RATE: 12 BRPM | HEIGHT: 65 IN | BODY MASS INDEX: 19.59 KG/M2 | DIASTOLIC BLOOD PRESSURE: 80 MMHG | SYSTOLIC BLOOD PRESSURE: 115 MMHG | OXYGEN SATURATION: 99 % | WEIGHT: 117.6 LBS | HEART RATE: 105 BPM | TEMPERATURE: 98.4 F

## 2022-11-02 DIAGNOSIS — Z85.3 HISTORY OF BREAST CANCER: Primary | ICD-10-CM

## 2022-11-02 DIAGNOSIS — Z85.820 HX OF MELANOMA OF SKIN: ICD-10-CM

## 2022-11-02 DIAGNOSIS — Z85.3 HISTORY OF BREAST CANCER: ICD-10-CM

## 2022-11-02 DIAGNOSIS — Z79.811 AROMATASE INHIBITOR USE: ICD-10-CM

## 2022-11-02 DIAGNOSIS — Z91.89 AT RISK FOR BONE DENSITY LOSS: ICD-10-CM

## 2022-11-02 DIAGNOSIS — N94.10 DYSPAREUNIA IN FEMALE: ICD-10-CM

## 2022-11-02 LAB
ALBUMIN SERPL-MCNC: 4.1 G/DL (ref 3.5–5)
ALBUMIN/GLOB SERPL: 1.4 {RATIO} (ref 0.4–1.6)
ALP SERPL-CCNC: 54 U/L (ref 50–136)
ALT SERPL-CCNC: 16 U/L (ref 12–65)
ANION GAP SERPL CALC-SCNC: 6 MMOL/L (ref 2–11)
AST SERPL-CCNC: 22 U/L (ref 15–37)
BASOPHILS # BLD: 0.1 K/UL (ref 0–0.2)
BASOPHILS NFR BLD: 1 % (ref 0–2)
BILIRUB SERPL-MCNC: 0.7 MG/DL (ref 0.2–1.1)
BUN SERPL-MCNC: 11 MG/DL (ref 6–23)
CALCIUM SERPL-MCNC: 9.8 MG/DL (ref 8.3–10.4)
CHLORIDE SERPL-SCNC: 106 MMOL/L (ref 101–110)
CO2 SERPL-SCNC: 27 MMOL/L (ref 21–32)
CREAT SERPL-MCNC: 0.7 MG/DL (ref 0.6–1)
DIFFERENTIAL METHOD BLD: ABNORMAL
EOSINOPHIL # BLD: 0 K/UL (ref 0–0.8)
EOSINOPHIL NFR BLD: 1 % (ref 0.5–7.8)
ERYTHROCYTE [DISTWIDTH] IN BLOOD BY AUTOMATED COUNT: 12.4 % (ref 11.9–14.6)
GLOBULIN SER CALC-MCNC: 3 G/DL (ref 2.8–4.5)
GLUCOSE SERPL-MCNC: 91 MG/DL (ref 65–100)
HCT VFR BLD AUTO: 40.4 % (ref 35.8–46.3)
HGB BLD-MCNC: 13.2 G/DL (ref 11.7–15.4)
IMM GRANULOCYTES # BLD AUTO: 0 K/UL (ref 0–0.5)
IMM GRANULOCYTES NFR BLD AUTO: 0 % (ref 0–5)
LYMPHOCYTES # BLD: 1.3 K/UL (ref 0.5–4.6)
LYMPHOCYTES NFR BLD: 31 % (ref 13–44)
MCH RBC QN AUTO: 30.1 PG (ref 26.1–32.9)
MCHC RBC AUTO-ENTMCNC: 32.7 G/DL (ref 31.4–35)
MCV RBC AUTO: 92.2 FL (ref 82–102)
MONOCYTES # BLD: 0.5 K/UL (ref 0.1–1.3)
MONOCYTES NFR BLD: 11 % (ref 4–12)
NEUTS SEG # BLD: 2.4 K/UL (ref 1.7–8.2)
NEUTS SEG NFR BLD: 56 % (ref 43–78)
NRBC # BLD: 0 K/UL (ref 0–0.2)
PLATELET # BLD AUTO: 353 K/UL (ref 150–450)
PMV BLD AUTO: 9 FL (ref 9.4–12.3)
POTASSIUM SERPL-SCNC: 4.1 MMOL/L (ref 3.5–5.1)
PROT SERPL-MCNC: 7.1 G/DL (ref 6.3–8.2)
RBC # BLD AUTO: 4.38 M/UL (ref 4.05–5.2)
SODIUM SERPL-SCNC: 139 MMOL/L (ref 133–143)
WBC # BLD AUTO: 4.3 K/UL (ref 4.3–11.1)

## 2022-11-02 PROCEDURE — 99214 OFFICE O/P EST MOD 30 MIN: CPT | Performed by: INTERNAL MEDICINE

## 2022-11-02 PROCEDURE — G8427 DOCREV CUR MEDS BY ELIG CLIN: HCPCS | Performed by: INTERNAL MEDICINE

## 2022-11-02 PROCEDURE — 85025 COMPLETE CBC W/AUTO DIFF WBC: CPT

## 2022-11-02 PROCEDURE — G8484 FLU IMMUNIZE NO ADMIN: HCPCS | Performed by: INTERNAL MEDICINE

## 2022-11-02 PROCEDURE — 3017F COLORECTAL CA SCREEN DOC REV: CPT | Performed by: INTERNAL MEDICINE

## 2022-11-02 PROCEDURE — 80053 COMPREHEN METABOLIC PANEL: CPT

## 2022-11-02 PROCEDURE — 1036F TOBACCO NON-USER: CPT | Performed by: INTERNAL MEDICINE

## 2022-11-02 PROCEDURE — 36415 COLL VENOUS BLD VENIPUNCTURE: CPT

## 2022-11-02 PROCEDURE — G8420 CALC BMI NORM PARAMETERS: HCPCS | Performed by: INTERNAL MEDICINE

## 2022-11-02 ASSESSMENT — PATIENT HEALTH QUESTIONNAIRE - PHQ9
SUM OF ALL RESPONSES TO PHQ QUESTIONS 1-9: 0
2. FEELING DOWN, DEPRESSED OR HOPELESS: 0
SUM OF ALL RESPONSES TO PHQ QUESTIONS 1-9: 0

## 2022-11-02 NOTE — PATIENT INSTRUCTIONS
Patient Instructions from Today's Visit    Reason for Visit:  Follow up visit for breast cancer      Plan:    Continue letrozole. Dexa bone density scan reviewed. Can try revaree for vaginal dryness. Can try vaginal dilators. [employer]otics is a brand. Ishmael Deleon is something you can try as well. Will reach out to our genetics counselor to see if you meet criteria for genetics testing.     Follow Up:  - 4 months    Recent Lab Results:  Hospital Outpatient Visit on 11/02/2022   Component Date Value Ref Range Status    WBC 11/02/2022 4.3  4.3 - 11.1 K/uL Final    RBC 11/02/2022 4.38  4.05 - 5.2 M/uL Final    Hemoglobin 11/02/2022 13.2  11.7 - 15.4 g/dL Final    Hematocrit 11/02/2022 40.4  35.8 - 46.3 % Final    MCV 11/02/2022 92.2  82.0 - 102.0 FL Final    MCH 11/02/2022 30.1  26.1 - 32.9 PG Final    MCHC 11/02/2022 32.7  31.4 - 35.0 g/dL Final    RDW 11/02/2022 12.4  11.9 - 14.6 % Final    Platelets 38/36/3926 353  150 - 450 K/uL Final    MPV 11/02/2022 9.0 (A)  9.4 - 12.3 FL Final    nRBC 11/02/2022 0.00  0.0 - 0.2 K/uL Final    **Note: Absolute NRBC parameter is now reported with Hemogram**    Differential Type 11/02/2022 AUTOMATED    Final    Seg Neutrophils 11/02/2022 56  43 - 78 % Final    Lymphocytes 11/02/2022 31  13 - 44 % Final    Monocytes 11/02/2022 11  4.0 - 12.0 % Final    Eosinophils % 11/02/2022 1  0.5 - 7.8 % Final    Basophils 11/02/2022 1  0.0 - 2.0 % Final    Immature Granulocytes 11/02/2022 0  0.0 - 5.0 % Final    Segs Absolute 11/02/2022 2.4  1.7 - 8.2 K/UL Final    Absolute Lymph # 11/02/2022 1.3  0.5 - 4.6 K/UL Final    Absolute Mono # 11/02/2022 0.5  0.1 - 1.3 K/UL Final    Absolute Eos # 11/02/2022 0.0  0.0 - 0.8 K/UL Final    Basophils Absolute 11/02/2022 0.1  0.0 - 0.2 K/UL Final    Absolute Immature Granulocyte 11/02/2022 0.0  0.0 - 0.5 K/UL Final    Sodium 11/02/2022 139  133 - 143 mmol/L Final    Potassium 11/02/2022 4.1  3.5 - 5.1 mmol/L Final    Chloride 11/02/2022 106  101 - 110 mmol/L Final    CO2 11/02/2022 27  21 - 32 mmol/L Final    Anion Gap 11/02/2022 6  2 - 11 mmol/L Final    Glucose 11/02/2022 91  65 - 100 mg/dL Final    BUN 11/02/2022 11  6 - 23 MG/DL Final    Creatinine 11/02/2022 0.70  0.6 - 1.0 MG/DL Final    Est, Glom Filt Rate 11/02/2022 >60  >60 ml/min/1.73m2 Final    Comment:      Pediatric calculator link: Ivan.at. org/professionals/kdoqi/gfr_calculatorped       Effective Oct 3, 2022       These results are not intended for use in patients <25years of age. eGFR results are calculated without a race factor using  the 2021 CKD-EPI equation. Careful clinical correlation is recommended, particularly when comparing to results calculated using previous equations. The CKD-EPI equation is less accurate in patients with extremes of muscle mass, extra-renal metabolism of creatinine, excessive creatine ingestion, or following therapy that affects renal tubular secretion. Calcium 11/02/2022 9.8  8.3 - 10.4 MG/DL Final    Total Bilirubin 11/02/2022 0.7  0.2 - 1.1 MG/DL Final    ALT 11/02/2022 16  12 - 65 U/L Final    AST 11/02/2022 22  15 - 37 U/L Final    Alk Phosphatase 11/02/2022 54  50 - 136 U/L Final    Total Protein 11/02/2022 7.1  6.3 - 8.2 g/dL Final    Albumin 11/02/2022 4.1  3.5 - 5.0 g/dL Final    Globulin 11/02/2022 3.0  2.8 - 4.5 g/dL Final    Albumin/Globulin Ratio 11/02/2022 1.4  0.4 - 1.6   Final        Treatment Summary has been discussed and given to patient: n/a        -------------------------------------------------------------------------------------------------------------------  Please call our office at [phone] if you have any  of the following symptoms:   Fever of 100.5 or greater  Chills  Shortness of breath  Swelling or pain in one leg    After office hours an answering service is available and will contact a provider for emergencies or if you are experiencing any of the above symptoms. Patient did express an interest in My Chart.   My Chart log in information explained on the after visit summary printout at the [facility] Kyleigh Wade [address] desk.     Walter Brown RN

## 2023-01-31 RX ORDER — LEVOTHYROXINE, LIOTHYRONINE 38; 9 UG/1; UG/1
TABLET ORAL
Qty: 90 TABLET | OUTPATIENT
Start: 2023-01-31

## 2023-03-01 DIAGNOSIS — F41.9 ANXIETY DISORDER, UNSPECIFIED: ICD-10-CM

## 2023-03-01 RX ORDER — VENLAFAXINE 37.5 MG/1
TABLET ORAL
Qty: 90 TABLET | Refills: 2 | OUTPATIENT
Start: 2023-03-01

## 2023-03-06 DIAGNOSIS — Z85.3 HISTORY OF BREAST CANCER: Primary | ICD-10-CM

## 2023-03-08 ENCOUNTER — HOSPITAL ENCOUNTER (OUTPATIENT)
Dept: LAB | Age: 58
Discharge: HOME | End: 2023-03-11
Payer: COMMERCIAL

## 2023-03-08 ENCOUNTER — OFFICE VISIT (OUTPATIENT)
Dept: ONCOLOGY | Age: 58
End: 2023-03-08
Payer: COMMERCIAL

## 2023-03-08 VITALS
BODY MASS INDEX: 19.84 KG/M2 | HEIGHT: 65 IN | DIASTOLIC BLOOD PRESSURE: 87 MMHG | RESPIRATION RATE: 16 BRPM | OXYGEN SATURATION: 100 % | WEIGHT: 119.1 LBS | HEART RATE: 71 BPM | TEMPERATURE: 98.5 F | SYSTOLIC BLOOD PRESSURE: 129 MMHG

## 2023-03-08 DIAGNOSIS — Z85.3 HISTORY OF BREAST CANCER: ICD-10-CM

## 2023-03-08 DIAGNOSIS — Z79.811 AROMATASE INHIBITOR USE: ICD-10-CM

## 2023-03-08 DIAGNOSIS — Z85.3 HISTORY OF BREAST CANCER: Primary | ICD-10-CM

## 2023-03-08 LAB
ALBUMIN SERPL-MCNC: 4.3 G/DL (ref 3.5–5)
ALBUMIN/GLOB SERPL: 1.4 (ref 0.4–1.6)
ALP SERPL-CCNC: 61 U/L (ref 50–136)
ALT SERPL-CCNC: 16 U/L (ref 12–65)
ANION GAP SERPL CALC-SCNC: 4 MMOL/L (ref 2–11)
AST SERPL-CCNC: 24 U/L (ref 15–37)
BASOPHILS # BLD: 0.1 K/UL (ref 0–0.2)
BASOPHILS NFR BLD: 1 % (ref 0–2)
BILIRUB SERPL-MCNC: 0.6 MG/DL (ref 0.2–1.1)
BUN SERPL-MCNC: 12 MG/DL (ref 6–23)
CALCIUM SERPL-MCNC: 9.9 MG/DL (ref 8.3–10.4)
CHLORIDE SERPL-SCNC: 108 MMOL/L (ref 101–110)
CO2 SERPL-SCNC: 29 MMOL/L (ref 21–32)
CREAT SERPL-MCNC: 0.8 MG/DL (ref 0.6–1)
DIFFERENTIAL METHOD BLD: ABNORMAL
EOSINOPHIL # BLD: 0 K/UL (ref 0–0.8)
EOSINOPHIL NFR BLD: 1 % (ref 0.5–7.8)
ERYTHROCYTE [DISTWIDTH] IN BLOOD BY AUTOMATED COUNT: 12.3 % (ref 11.9–14.6)
GLOBULIN SER CALC-MCNC: 3.1 G/DL (ref 2.8–4.5)
GLUCOSE SERPL-MCNC: 97 MG/DL (ref 65–100)
HCT VFR BLD AUTO: 41 % (ref 35.8–46.3)
HGB BLD-MCNC: 13.2 G/DL (ref 11.7–15.4)
IMM GRANULOCYTES # BLD AUTO: 0 K/UL (ref 0–0.5)
IMM GRANULOCYTES NFR BLD AUTO: 0 % (ref 0–5)
LYMPHOCYTES # BLD: 1.3 K/UL (ref 0.5–4.6)
LYMPHOCYTES NFR BLD: 37 % (ref 13–44)
MCH RBC QN AUTO: 29.9 PG (ref 26.1–32.9)
MCHC RBC AUTO-ENTMCNC: 32.2 G/DL (ref 31.4–35)
MCV RBC AUTO: 93 FL (ref 82–102)
MONOCYTES # BLD: 0.4 K/UL (ref 0.1–1.3)
MONOCYTES NFR BLD: 11 % (ref 4–12)
NEUTS SEG # BLD: 1.8 K/UL (ref 1.7–8.2)
NEUTS SEG NFR BLD: 50 % (ref 43–78)
NRBC # BLD: 0 K/UL (ref 0–0.2)
PLATELET # BLD AUTO: 338 K/UL (ref 150–450)
PMV BLD AUTO: 9.3 FL (ref 9.4–12.3)
POTASSIUM SERPL-SCNC: 4 MMOL/L (ref 3.5–5.1)
PROT SERPL-MCNC: 7.4 G/DL (ref 6.3–8.2)
RBC # BLD AUTO: 4.41 M/UL (ref 4.05–5.2)
SODIUM SERPL-SCNC: 141 MMOL/L (ref 133–143)
WBC # BLD AUTO: 3.6 K/UL (ref 4.3–11.1)

## 2023-03-08 PROCEDURE — G8427 DOCREV CUR MEDS BY ELIG CLIN: HCPCS | Performed by: NURSE PRACTITIONER

## 2023-03-08 PROCEDURE — 99214 OFFICE O/P EST MOD 30 MIN: CPT | Performed by: NURSE PRACTITIONER

## 2023-03-08 PROCEDURE — 80053 COMPREHEN METABOLIC PANEL: CPT

## 2023-03-08 PROCEDURE — 36415 COLL VENOUS BLD VENIPUNCTURE: CPT

## 2023-03-08 PROCEDURE — G8420 CALC BMI NORM PARAMETERS: HCPCS | Performed by: NURSE PRACTITIONER

## 2023-03-08 PROCEDURE — 85025 COMPLETE CBC W/AUTO DIFF WBC: CPT

## 2023-03-08 PROCEDURE — 3017F COLORECTAL CA SCREEN DOC REV: CPT | Performed by: NURSE PRACTITIONER

## 2023-03-08 PROCEDURE — 1036F TOBACCO NON-USER: CPT | Performed by: NURSE PRACTITIONER

## 2023-03-08 PROCEDURE — G8484 FLU IMMUNIZE NO ADMIN: HCPCS | Performed by: NURSE PRACTITIONER

## 2023-03-08 RX ORDER — LETROZOLE 2.5 MG/1
2.5 TABLET, FILM COATED ORAL DAILY
Qty: 90 TABLET | Refills: 3 | Status: SHIPPED | OUTPATIENT
Start: 2023-03-08

## 2023-03-08 ASSESSMENT — ENCOUNTER SYMPTOMS
ABDOMINAL PAIN: 0
ABDOMINAL DISTENTION: 0
DIARRHEA: 0
VOMITING: 0
VOICE CHANGE: 0
WHEEZING: 0
SCLERAL ICTERUS: 0
CHEST TIGHTNESS: 0
NAUSEA: 0
SORE THROAT: 0
CONSTIPATION: 0
SHORTNESS OF BREATH: 0
TROUBLE SWALLOWING: 0
BLOOD IN STOOL: 0
HEMOPTYSIS: 0

## 2023-03-08 NOTE — PROGRESS NOTES
[facility]: Established patient - follow up     Chief Complaint   Patient presents with    Follow-up       Reason for Referral: History of breast cancer  Referring Provider: Konrad Singer DO  Family History of Cancer/Hematologic Disorders: None reported  Presenting Symptoms: No relevant physical symptoms reported     History of Present Illness:  Ms. Patrice Taylor is a 61 y.o. female who presents today in referral from Dr. Ling Moe for consultation regarding breast cancer. The past medical history is significant for hypothyroidism, depression/anxiety, fibroids, melanoma in 2012, and breast cancer. She established primary care with Dr. Konrad Singer on 5/18/22 after recently moving to [place] from [place], [place]. She requested a referral to oncology. Review of records indicates that patient was last seen by Oncologist, Dr. Alhaji Alvarez, at [address] [place] [address] on 12/15/21. According to Dr. Shiva Bear notes, patient was diagnosed with a left breast cancer. She had palpated a lump to the left breast about 7 years prior, states that they were just monitoring it, and felt it was nothing to worry about. 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 She underwent lumpectomy with adjuvant radiation therapy, but she declined any further treatment with recommended Tamoxifen. In 2006, she again self-palpated a lump to the right breast. A 3.95 mm breast cancer, ER/MT+, HER-2/conner - was noted on pathology. She again underwent lumpectomy with adjuvant radiation therapy and five years of Tamoxifen. Bilateral mammogram done 10/29/2015 noted mild diffuse dense breast parenchyma bilaterally. There was no evidence of any suspicious dominant mass lesion, spiculation and or microcalcifications. The retro-areolar and axillary portions of both breasts were unremarkable.  Patient stated that she had no issues for quite some time, until 2015, when she again self-palpated a lump in her right breast. Lumpectomy was done on 12/14/2018, and pathology noted invasive ductal carcinoma, 0.6 cm, grade 2, ER/NH +, HER2 -. She opted to procced with a skin sparing bilateral mastectomy, which was done 01/21/2016. The left breast noted fibrocystic changes, negative for atypia or malignancy. The right breast noting extensive previous biopsy site and reactive changes, negative for residual invasive carcinoma. Three lymph nodes were removed from the right axilla, and all three nodes were negative for carcinoma. Staging was [id number]. Oncotype DX was not done. BRCA testing was negative. The patient did establish care with [facility] and [address] [place] [address] on 11/11/2016. She was restarted on Tamoxifen, which was changed to [address] in February of 2018, due to menopause status. The patient states that she was on AI therapy for about 3 months, but she was unable to handle the side effects of the medication. She did try cutting the medication dose in half, which was better, but she still opted to discontinue the medication. She does however take a hormone balancing supplement with DIM. PET/CT scan done on 11/30/2016 noted a vague area of hypermetabolism within the right breast subareolar region with subtle soft tissue fullness when compared to the contralateral side; post-surgical changes of bilateral mastectomy, bilateral axillary lymph node dissection, and bilateral breast reconstruction; no suspicious hypermetabolic lymphadenopathy of the head and neck, chest, abdomen or pelvis; and no suspicious hypermetabolic osseous lesion. Bone density was performed on 03/14/2018, noting osteopenia. Patient does take daily calcium and vitamin D, but she has declined any bisphosphonate therapy. She was seen by her oncologist with [facility] in October of 2018.  They discussed the continuation of AI therapy, along with consideration of bisphosphonate therapy. The patient was not interested in either of those recommendations and opted not to continue her care with this group. She was looking for a provider with a more holistic approach and was referred to Dr. Mirlande Collier. She was evaluated in consultation by Dr. Mirlande Collier on 4/2/19 to establish care as a new patient due to a history of breast cancer. Patient was interested in moving forward with the possibility of an alternative AI and was prescribed letrozole or exemestene. During follow-up appointment with Dr. Mirlande Collier on 10/16/19, patient opted to proceed with letrozole 2.5mg po daily. She was also referred to a naturopathic oncology provider. Ultimately, patient did not end up starting the letrozole. She was last seen by Dr. Mirlande Collier on 11/17/20. Patient has since relocated from [place] to [place] and is seeking to establish oncology care with [facility] for her history of breast cancer. During consultation, we discussed the pathophysiology of breast cancer, staging, and the importance of receptor status in terms of treatment options. We then reviewed her medical history as well as oncologic history, recent imaging and pathology in detail. We discussed next steps in management. She is here with her . She is here today for follow up, on Letrozole. She has been well overall. She has been busy helping with her sister's care/was undergoing chemo. Luckily, her sister is doing very well now and has made a remarkable turn around in terms of her cancer/health. She admits she has missed some doses of Letrozole as a result. She plans to start taking this regularly again, plans to take at bedtime. She has not had any side effects aside from ongoing vaginal dryness. She is considering having Trenda Maricruz Touch procedure. She denies any pain. She denies any breast concerns/lumps/bumps and no palpable abnormalities noted on exam today. She denies any shortness of breath. She denies any fevers or other infectious symptoms. She is taking Ca/Vit D. Family History   Problem Relation Age of Onset    Other Neg Hx     Post-op Nausea/Vomiting Neg Hx     Delayed Awakening Neg Hx     Pseudochol. Deficiency Neg Hx     Malig Hypertherm Neg Hx     Colon Cancer Neg Hx     Ovarian Cancer Neg Hx     Breast Cancer Neg Hx     Emergence Delirium Neg Hx     Post-op Cognitive Dysfunction Neg Hx     Dementia Mother     Cancer Maternal Grandfather         lung    Cancer Other         cervical or uterine      Social History     Socioeconomic History    Marital status: Unknown     Spouse name: None    Number of children: None    Years of education: None    Highest education level: None   Tobacco Use    Smoking status: Never    Smokeless tobacco: Never   Substance and Sexual Activity    Alcohol use: Yes     Alcohol/week: 4.2 standard drinks    Drug use: No        Review of Systems   Constitutional:  Negative for appetite change, chills, diaphoresis, fatigue, fever and unexpected weight change. HENT:   Negative for hearing loss, mouth sores, nosebleeds, sore throat, trouble swallowing and voice change. Eyes:  Negative for icterus. Respiratory:  Negative for chest tightness, hemoptysis, shortness of breath and wheezing. Cardiovascular:  Negative for chest pain, leg swelling and palpitations. Gastrointestinal:  Negative for abdominal distention, abdominal pain, blood in stool, constipation, diarrhea, nausea and vomiting. Endocrine: Negative for hot flashes. Genitourinary:  Positive for dyspareunia. Negative for difficulty urinating, frequency, vaginal bleeding and vaginal discharge. Musculoskeletal:  Positive for arthralgias. Negative for flank pain, gait problem and myalgias. Skin:  Negative for itching, rash and wound. Neurological:  Negative for dizziness, extremity weakness, gait problem, headaches and numbness. Psychiatric/Behavioral:  Negative for confusion and depression. The patient is not nervous/anxious. No Known Allergies  Past Medical History:   Diagnosis Date    Breast cancer ([facility] [address]     Cancer ([facility] [address] 2012    melanoma/early stage--back    Cancer [facility]) 2004 - 2006    breast cancer    Fibroids     Radiation therapy complication     Thyroid disease      Past Surgical History:   Procedure Laterality Date    BREAST LUMPECTOMY Bilateral     2004 AND 2006    BREAST RECONSTRUCTION  2016    bilateral breast reconstruction with implants     BREAST SURGERY  2004    biopsy    BREAST SURGERY  2004 and  2006    lumpectomy x 2    COLPOSCOPY  12/01/08    GYN  12/27/07    endometrial bx    GYN  01/21/10     D&C, cervical bx; visually directed bx's of endometrium    MASTECTOMY, BILATERAL  2016     Current Outpatient Medications   Medication Sig Dispense Refill    letrozole (FEMARA) 2.5 MG tablet Take 1 tablet by mouth daily 90 tablet 3    NP THYROID 60 MG tablet TAKE 1 TABLET BY MOUTH EVERY MORNING      rizatriptan (MAXALT) 10 MG tablet Take 1 tablet by mouth once as needed for Migraine May repeat in 2 hours if needed 9 tablet 3    venlafaxine (EFFEXOR XR) 37.5 MG extended release capsule Take 1 capsule by mouth in the morning. 90 capsule 2     No current facility-administered medications for this visit. No flowsheet data found. OBJECTIVE:  /87   Pulse 71   Temp 98.5 °F (36.9 °C)   Resp 16   Ht 5' 4.5\" (1.638 m)   Wt 119 lb 1.6 oz (54 kg)   SpO2 100%   BMI 20.13 kg/m²       ECOG PERFORMANCE STATUS - 0-Fully active, able to carry on all pre-disease performance without restriction. Pain - Pain Score:   0 - No pain0 - No pain/10. None/Minimal pain - not affecting QOL     Fatigue - No flowsheet data found. Distress - No flowsheet data found. Physical Exam  Vitals reviewed. Exam conducted with a chaperone present. Constitutional:       General: She is not in acute distress. Appearance: Normal appearance. She is normal weight.  She is not ill-appearing or toxic-appearing.   HENT:      Head: Normocephalic and atraumatic.      Nose: Nose normal. No congestion.      Mouth/Throat:      Mouth: Mucous membranes are moist.   Eyes:      General: No scleral icterus.     Extraocular Movements: Extraocular movements intact.      Conjunctiva/sclera: Conjunctivae normal.      Pupils: Pupils are equal, round, and reactive to light.   Cardiovascular:      Rate and Rhythm: Normal rate and regular rhythm.      Heart sounds: No murmur heard.  Pulmonary:      Effort: Pulmonary effort is normal. No respiratory distress.      Breath sounds: Normal breath sounds. No wheezing or rales.   Chest:          Comments: S/p bilateral mastectomies with recon   No mass/lump   No axillary LAD bilaterally   Abdominal:      General: There is no distension.      Palpations: Abdomen is soft.      Tenderness: There is no abdominal tenderness.   Musculoskeletal:         General: Normal range of motion.      Cervical back: Normal range of motion.      Right lower leg: No edema.      Left lower leg: No edema.   Lymphadenopathy:      Cervical: No cervical adenopathy.      Upper Body:      Right upper body: No supraclavicular or axillary adenopathy.      Left upper body: No supraclavicular or axillary adenopathy.   Skin:     General: Skin is warm and dry.      Coloration: Skin is not jaundiced or pale.      Findings: No rash.   Neurological:      General: No focal deficit present.      Mental Status: She is alert and oriented to person, place, and time.      Gait: Gait normal.   Psychiatric:         Behavior: Behavior normal.         Thought Content: Thought content normal.        Labs:  Recent Results (from the past 168 hour(s))   CBC with Auto Differential    Collection Time: 03/08/23 10:30 AM   Result Value Ref Range    WBC 3.6 (L) 4.3 - 11.1 K/uL    RBC 4.41 4.05 - 5.2 M/uL    Hemoglobin 13.2 11.7 - 15.4 g/dL    Hematocrit 41.0 35.8 - 46.3 %    MCV 93.0 82.0 - 102.0 FL    MCH 29.9 26.1 - 32.9 PG    MCHC  32.2 31.4 - 35.0 g/dL    RDW 12.3 11.9 - 14.6 %    Platelets 052 542 - 458 K/uL    MPV 9.3 (L) 9.4 - 12.3 FL    nRBC 0.00 0.0 - 0.2 K/uL    Seg Neutrophils 50 43 - 78 %    Lymphocytes 37 13 - 44 %    Monocytes 11 4.0 - 12.0 %    Eosinophils % 1 0.5 - 7.8 %    Basophils 1 0.0 - 2.0 %    Immature Granulocytes 0 0.0 - 5.0 %    Segs Absolute 1.8 1.7 - 8.2 K/UL    Absolute Lymph # 1.3 0.5 - 4.6 K/UL    Absolute Mono # 0.4 0.1 - 1.3 K/UL    Absolute Eos # 0.0 0.0 - 0.8 K/UL    Basophils Absolute 0.1 0.0 - 0.2 K/UL    Absolute Immature Granulocyte 0.0 0.0 - 0.5 K/UL    Differential Type AUTOMATED     Comprehensive Metabolic Panel    Collection Time: 03/08/23 10:30 AM   Result Value Ref Range    Sodium 141 133 - 143 mmol/L    Potassium 4.0 3.5 - 5.1 mmol/L    Chloride 108 101 - 110 mmol/L    CO2 29 21 - 32 mmol/L    Anion Gap 4 2 - 11 mmol/L    Glucose 97 65 - 100 mg/dL    BUN 12 6 - 23 MG/DL    Creatinine 0.80 0.6 - 1.0 MG/DL    Est, Glom Filt Rate >60 >60 ml/min/1.73m2    Calcium 9.9 8.3 - 10.4 MG/DL    Total Bilirubin 0.6 0.2 - 1.1 MG/DL    ALT 16 12 - 65 U/L    AST 24 15 - 37 U/L    Alk Phosphatase 61 50 - 136 U/L    Total Protein 7.4 6.3 - 8.2 g/dL    Albumin 4.3 3.5 - 5.0 g/dL    Globulin 3.1 2.8 - 4.5 g/dL    Albumin/Globulin Ratio 1.4 0.4 - 1.6           Imaging: reviewed     [facility] - OS records sent to scanning     ASSESSMENT:     Diagnosis Orders   1. History of breast cancer  letrozole (FEMARA) 2.5 MG tablet      2. Aromatase inhibitor use            Ms. Shorty Lott is here for Fu  of breast cancer.       1. 2004 - 1.4cm, IDC, tubular type, CB76/BY94 pos, Her2 neg breast cancer - s/p lumpectomy, SLNx neg for malignancy, s/p XRT; rec Bae but pt elected not to proceed   2006 - right breast IDC 3.95mm, grade 2, ER3%/PR21%, her2 neg - [id number] - s/p lumpectomy and XRT; BRCA1/2 testing reportedly negative   2015 - IDC, 6mm, %/PR98%, Nqe3Nvs neg - pT1bN0 (0/3)Mx - OncotypeDx not done; s/p bilateral nipple sparing mastectomies   11/11/16 - started Bae   1/2018 - stopped Bae and started anastrozole   4/2018 - did not tolerate AI, declined alternate - decided on half dose anastrozole due to high risk   7/2022 - meeting me for 1st time; has not been on endo therapy; started Letrozole    - here for FU. Tolerating letrozole very well. She admits she has missed some doses d/t her sister's previous cancer diagnosis/treatment. She will work on this and try to take every bed time. - bone loss - Bone density previously reviewed 9/2022 and she will remain on vitamin D/calcium. - vaginal dryness/atrophy - For quite some time, she has been experiencing vaginal dryness and we reviewed different ways to ameliorate the symptom. She will start Reveree vaginal moisturizer. We discussed the role of vaginal dilation and I provided her resources to investigate this further. Additional tips reviewed; she is considering [address] - her sister who is older than 61 was recently diagnosed with advanced ovarian cancer. Patient had prior genetic testing and was told she was BRCA negative. Previously sent a note to Royce Sharma to determine if patient meets criteria for additional genetic testing.  - supplements reviewed - Previously advised for pt to avoid current hormone balance supplement; [facility] herbs website shared with pt     RESUSCITATION DIRECTIVES/HOSPICE CARE: Full Support    RTC 3-4mo or sooner as needed     MDM      Lab studies and imaging studies were personally reviewed. Pertinent old records were reviewed. Historical:   - we discussed the pathophysiology of breast cancer, staging, and the importance of receptor status in terms of treatment options. We then reviewed her medical history as well as oncologic history, recent imaging and pathology in detail.  - discussed recs of endo therapy.     - AI can cause hot flashes, HTN, angina, swelling, fatigue, bone thinning leading to osteoporosis/fractures, joint stiffness, N/V, hair thinning, weight changes, thrombosis and worsening depression to name a few. Pt willing to try letrozole. - DEXA q2 years; discussed role of wt bearing exercise and bone health; pt not interested in bisphosphonate therapy - but we discussed options and indications today    All questions were asked and answered to the best of my ability. The patient verbalized understanding and agrees with the plan above.             FELICIA De La Garza - DAVID  [facility] On[facility]  [address] [place] [address]  Office : [phone]  Fax : [phone]

## 2023-03-29 ENCOUNTER — CLINICAL DOCUMENTATION (OUTPATIENT)
Dept: ONCOLOGY | Age: 58
End: 2023-03-29

## 2023-03-29 NOTE — PROGRESS NOTES
Received message that patient patient was no show to both genetics appointments. Letter was sent to patient on 3/29 in attempt to reschedule again. Will await patient call or message to reschedule if they so wish at this time. Follow up appointment with Dr. Rachael Encinas is in July.

## 2023-07-06 DIAGNOSIS — Z79.811 AROMATASE INHIBITOR USE: Primary | ICD-10-CM

## 2023-07-06 ASSESSMENT — ENCOUNTER SYMPTOMS
ABDOMINAL PAIN: 0
CHEST TIGHTNESS: 0
SHORTNESS OF BREATH: 0
SCLERAL ICTERUS: 0
NAUSEA: 0
HEMOPTYSIS: 0
CONSTIPATION: 0
TROUBLE SWALLOWING: 0
SORE THROAT: 0
WHEEZING: 0
ABDOMINAL DISTENTION: 0
DIARRHEA: 0
BLOOD IN STOOL: 0
VOMITING: 0
VOICE CHANGE: 0

## 2023-07-12 ENCOUNTER — OFFICE VISIT (OUTPATIENT)
Dept: ONCOLOGY | Age: 58
End: 2023-07-12
Payer: COMMERCIAL

## 2023-07-12 ENCOUNTER — HOSPITAL ENCOUNTER (OUTPATIENT)
Dept: LAB | Age: 58
Discharge: HOME | End: 2023-07-15
Payer: COMMERCIAL

## 2023-07-12 VITALS
HEIGHT: 65 IN | TEMPERATURE: 98 F | WEIGHT: 121 LBS | HEART RATE: 79 BPM | RESPIRATION RATE: 18 BRPM | OXYGEN SATURATION: 99 % | DIASTOLIC BLOOD PRESSURE: 82 MMHG | SYSTOLIC BLOOD PRESSURE: 123 MMHG | BODY MASS INDEX: 20.16 KG/M2

## 2023-07-12 DIAGNOSIS — C50.919 RECURRENT MALIGNANT NEOPLASM OF BREAST, UNSPECIFIED LATERALITY (HCC): Primary | ICD-10-CM

## 2023-07-12 DIAGNOSIS — Z79.811 AROMATASE INHIBITOR USE: ICD-10-CM

## 2023-07-12 DIAGNOSIS — Z91.89 AT RISK FOR BONE DENSITY LOSS: ICD-10-CM

## 2023-07-12 LAB
ALBUMIN SERPL-MCNC: 4.2 G/DL (ref 3.5–5)
ALBUMIN/GLOB SERPL: 1.5 (ref 0.4–1.6)
ALP SERPL-CCNC: 56 U/L (ref 50–136)
ALT SERPL-CCNC: 16 U/L (ref 12–65)
ANION GAP SERPL CALC-SCNC: 4 MMOL/L (ref 2–11)
AST SERPL-CCNC: 20 U/L (ref 15–37)
BASOPHILS # BLD: 0.1 K/UL (ref 0–0.2)
BASOPHILS NFR BLD: 1 % (ref 0–2)
BILIRUB SERPL-MCNC: 0.6 MG/DL (ref 0.2–1.1)
BUN SERPL-MCNC: 14 MG/DL (ref 6–23)
CALCIUM SERPL-MCNC: 9.4 MG/DL (ref 8.3–10.4)
CHLORIDE SERPL-SCNC: 106 MMOL/L (ref 101–110)
CO2 SERPL-SCNC: 29 MMOL/L (ref 21–32)
CREAT SERPL-MCNC: 0.7 MG/DL (ref 0.6–1)
DIFFERENTIAL METHOD BLD: ABNORMAL
EOSINOPHIL # BLD: 0 K/UL (ref 0–0.8)
EOSINOPHIL NFR BLD: 1 % (ref 0.5–7.8)
ERYTHROCYTE [DISTWIDTH] IN BLOOD BY AUTOMATED COUNT: 12.5 % (ref 11.9–14.6)
GLOBULIN SER CALC-MCNC: 2.8 G/DL (ref 2.8–4.5)
GLUCOSE SERPL-MCNC: 98 MG/DL (ref 65–100)
HCT VFR BLD AUTO: 38.3 % (ref 35.8–46.3)
HGB BLD-MCNC: 12.7 G/DL (ref 11.7–15.4)
IMM GRANULOCYTES # BLD AUTO: 0 K/UL (ref 0–0.5)
IMM GRANULOCYTES NFR BLD AUTO: 0 % (ref 0–5)
LYMPHOCYTES # BLD: 1.3 K/UL (ref 0.5–4.6)
LYMPHOCYTES NFR BLD: 29 % (ref 13–44)
MCH RBC QN AUTO: 30.5 PG (ref 26.1–32.9)
MCHC RBC AUTO-ENTMCNC: 33.2 G/DL (ref 31.4–35)
MCV RBC AUTO: 91.8 FL (ref 82–102)
MONOCYTES # BLD: 0.4 K/UL (ref 0.1–1.3)
MONOCYTES NFR BLD: 9 % (ref 4–12)
NEUTS SEG # BLD: 2.6 K/UL (ref 1.7–8.2)
NEUTS SEG NFR BLD: 60 % (ref 43–78)
NRBC # BLD: 0 K/UL (ref 0–0.2)
PLATELET # BLD AUTO: 326 K/UL (ref 150–450)
PMV BLD AUTO: 9.1 FL (ref 9.4–12.3)
POTASSIUM SERPL-SCNC: 4.3 MMOL/L (ref 3.5–5.1)
PROT SERPL-MCNC: 7 G/DL (ref 6.3–8.2)
RBC # BLD AUTO: 4.17 M/UL (ref 4.05–5.2)
SODIUM SERPL-SCNC: 139 MMOL/L (ref 133–143)
WBC # BLD AUTO: 4.4 K/UL (ref 4.3–11.1)

## 2023-07-12 PROCEDURE — 36415 COLL VENOUS BLD VENIPUNCTURE: CPT

## 2023-07-12 PROCEDURE — 80053 COMPREHEN METABOLIC PANEL: CPT

## 2023-07-12 PROCEDURE — 99214 OFFICE O/P EST MOD 30 MIN: CPT | Performed by: INTERNAL MEDICINE

## 2023-07-12 PROCEDURE — 85025 COMPLETE CBC W/AUTO DIFF WBC: CPT

## 2023-07-12 RX ORDER — LETROZOLE 2.5 MG/1
2.5 TABLET, FILM COATED ORAL DAILY
Qty: 90 TABLET | Refills: 3 | Status: SHIPPED | OUTPATIENT
Start: 2023-07-12

## 2023-07-12 ASSESSMENT — PATIENT HEALTH QUESTIONNAIRE - PHQ9
SUM OF ALL RESPONSES TO PHQ QUESTIONS 1-9: 0
SUM OF ALL RESPONSES TO PHQ QUESTIONS 1-9: 0
SUM OF ALL RESPONSES TO PHQ9 QUESTIONS 1 & 2: 0
SUM OF ALL RESPONSES TO PHQ QUESTIONS 1-9: 0
1. LITTLE INTEREST OR PLEASURE IN DOING THINGS: 0
2. FEELING DOWN, DEPRESSED OR HOPELESS: 0
SUM OF ALL RESPONSES TO PHQ QUESTIONS 1-9: 0

## 2023-08-29 RX ORDER — RIZATRIPTAN BENZOATE 10 MG/1
10 TABLET ORAL
Qty: 4 TABLET | Refills: 8 | OUTPATIENT
Start: 2023-08-29 | End: 2023-08-29

## 2023-10-11 DIAGNOSIS — F32.A ANXIETY AND DEPRESSION: ICD-10-CM

## 2023-10-11 DIAGNOSIS — F41.9 ANXIETY AND DEPRESSION: ICD-10-CM

## 2023-10-11 RX ORDER — VENLAFAXINE HYDROCHLORIDE 37.5 MG/1
37.5 CAPSULE, EXTENDED RELEASE ORAL EVERY MORNING
Qty: 90 CAPSULE | Refills: 2 | OUTPATIENT
Start: 2023-10-11

## 2023-11-15 ENCOUNTER — TELEPHONE (OUTPATIENT)
Dept: RADIATION ONCOLOGY | Age: 58
End: 2023-11-15

## 2024-03-11 RX ORDER — RIZATRIPTAN BENZOATE 10 MG/1
10 TABLET ORAL
Qty: 4 TABLET | Refills: 8 | OUTPATIENT
Start: 2024-03-11 | End: 2024-03-11

## 2024-04-30 ENCOUNTER — HOSPITAL ENCOUNTER (OUTPATIENT)
Dept: LAB | Age: 59
Discharge: HOME | End: 2024-05-03
Payer: COMMERCIAL

## 2024-04-30 ENCOUNTER — OFFICE VISIT (OUTPATIENT)
Dept: ONCOLOGY | Age: 59
End: 2024-04-30

## 2024-04-30 VITALS
SYSTOLIC BLOOD PRESSURE: 121 MMHG | HEIGHT: 65 IN | RESPIRATION RATE: 22 BRPM | BODY MASS INDEX: 20.51 KG/M2 | TEMPERATURE: 98.7 F | DIASTOLIC BLOOD PRESSURE: 76 MMHG | HEART RATE: 90 BPM | WEIGHT: 123.1 LBS | OXYGEN SATURATION: 100 %

## 2024-04-30 DIAGNOSIS — Z85.3 HISTORY OF BREAST CANCER: Primary | ICD-10-CM

## 2024-04-30 DIAGNOSIS — C50.919 RECURRENT MALIGNANT NEOPLASM OF BREAST, UNSPECIFIED LATERALITY (HCC): ICD-10-CM

## 2024-04-30 DIAGNOSIS — Z91.89 AT RISK FOR BONE DENSITY LOSS: ICD-10-CM

## 2024-04-30 DIAGNOSIS — Z79.811 AROMATASE INHIBITOR USE: ICD-10-CM

## 2024-04-30 LAB
ALBUMIN SERPL-MCNC: 4.3 G/DL (ref 3.2–4.6)
ALBUMIN/GLOB SERPL: 1.7 (ref 1–1.9)
ALP SERPL-CCNC: 55 U/L (ref 35–104)
ALT SERPL-CCNC: 13 U/L (ref 12–65)
ANION GAP SERPL CALC-SCNC: 12 MMOL/L (ref 9–18)
AST SERPL-CCNC: 32 U/L (ref 15–37)
BASOPHILS # BLD: 0.1 K/UL (ref 0–0.2)
BASOPHILS NFR BLD: 1 % (ref 0–2)
BILIRUB SERPL-MCNC: 0.6 MG/DL (ref 0–1.2)
BUN SERPL-MCNC: 18 MG/DL (ref 8–23)
CALCIUM SERPL-MCNC: 9.5 MG/DL (ref 8.8–10.2)
CHLORIDE SERPL-SCNC: 99 MMOL/L (ref 98–107)
CO2 SERPL-SCNC: 28 MMOL/L (ref 20–28)
CREAT SERPL-MCNC: 0.76 MG/DL (ref 0.6–1.1)
DIFFERENTIAL METHOD BLD: ABNORMAL
EOSINOPHIL # BLD: 0 K/UL (ref 0–0.8)
EOSINOPHIL NFR BLD: 1 % (ref 0.5–7.8)
ERYTHROCYTE [DISTWIDTH] IN BLOOD BY AUTOMATED COUNT: 12.5 % (ref 11.9–14.6)
GLOBULIN SER CALC-MCNC: 2.6 G/DL (ref 2.3–3.5)
GLUCOSE SERPL-MCNC: 94 MG/DL (ref 70–99)
HCT VFR BLD AUTO: 37.5 % (ref 35.8–46.3)
HGB BLD-MCNC: 12.2 G/DL (ref 11.7–15.4)
IMM GRANULOCYTES # BLD AUTO: 0 K/UL (ref 0–0.5)
IMM GRANULOCYTES NFR BLD AUTO: 0 % (ref 0–5)
LYMPHOCYTES # BLD: 1.9 K/UL (ref 0.5–4.6)
LYMPHOCYTES NFR BLD: 33 % (ref 13–44)
MCH RBC QN AUTO: 29.8 PG (ref 26.1–32.9)
MCHC RBC AUTO-ENTMCNC: 32.5 G/DL (ref 31.4–35)
MCV RBC AUTO: 91.5 FL (ref 82–102)
MONOCYTES # BLD: 0.5 K/UL (ref 0.1–1.3)
MONOCYTES NFR BLD: 9 % (ref 4–12)
NEUTS SEG # BLD: 3.2 K/UL (ref 1.7–8.2)
NEUTS SEG NFR BLD: 56 % (ref 43–78)
NRBC # BLD: 0 K/UL (ref 0–0.2)
PLATELET # BLD AUTO: 289 K/UL (ref 150–450)
PMV BLD AUTO: 9.1 FL (ref 9.4–12.3)
POTASSIUM SERPL-SCNC: 3.8 MMOL/L (ref 3.5–5.1)
PROT SERPL-MCNC: 6.9 G/DL (ref 6.3–8.2)
RBC # BLD AUTO: 4.1 M/UL (ref 4.05–5.2)
SODIUM SERPL-SCNC: 139 MMOL/L (ref 136–145)
WBC # BLD AUTO: 5.7 K/UL (ref 4.3–11.1)

## 2024-04-30 PROCEDURE — 36415 COLL VENOUS BLD VENIPUNCTURE: CPT

## 2024-04-30 PROCEDURE — 80053 COMPREHEN METABOLIC PANEL: CPT

## 2024-04-30 PROCEDURE — 85025 COMPLETE CBC W/AUTO DIFF WBC: CPT

## 2024-04-30 RX ORDER — ESTRADIOL 0.1 MG/G
CREAM VAGINAL
COMMUNITY
Start: 2024-01-17

## 2024-04-30 RX ORDER — LEVOTHYROXINE SODIUM 112 UG/1
TABLET ORAL
COMMUNITY
Start: 2024-04-09

## 2024-04-30 ASSESSMENT — ENCOUNTER SYMPTOMS
BLOOD IN STOOL: 0
SORE THROAT: 0
ABDOMINAL DISTENTION: 0
CONSTIPATION: 0
SCLERAL ICTERUS: 0
ABDOMINAL PAIN: 0
DIARRHEA: 0
VOMITING: 0
SHORTNESS OF BREATH: 0
NAUSEA: 0
TROUBLE SWALLOWING: 0
VOICE CHANGE: 0
CHEST TIGHTNESS: 0
WHEEZING: 0
HEMOPTYSIS: 0

## 2024-04-30 NOTE — PROGRESS NOTES
[facility]: Established patient - follow up     Chief Complaint   Patient presents with    Follow-up     Reason for Referral: History of breast cancer  Referring Provider: Kya Cunningham DO  Family History of Cancer/Hematologic Disorders: None reported  Presenting Symptoms: No relevant physical symptoms reported     History of Present Illness:  Ms. Salinas is a 59 y.o. female who presents today for follow up regarding breast cancer.  The past medical history is significant for hypothyroidism, depression/anxiety, fibroids, melanoma in 2012, and breast cancer. She established primary care with Dr. Kya Cunningham on 5/18/22 after recently moving to [place] from [place], [place]. She requested a referral to oncology. Review of records indicates that patient was last seen by Oncologist, Dr. Olvin Fu, at [facility] on 12/15/21. According to Dr. Fu’s notes, patient was diagnosed with a left breast cancer.  She had palpated a lump to the left breast about 7 years prior, states that they were just monitoring it, and felt it was nothing to worry about.  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 She underwent lumpectomy with adjuvant radiation therapy, but she declined any further treatment with recommended Tamoxifen.  In 2006, she again self-palpated a lump to the right breast. A 3.95 mm breast cancer, ER/AZ+, HER-2/conner - was noted on pathology. She again underwent lumpectomy with adjuvant radiation therapy and five years of Tamoxifen.      Bilateral mammogram done 10/29/2015 noted mild diffuse dense breast parenchyma bilaterally.  There was no evidence of any suspicious dominant mass lesion, spiculation and or microcalcifications. The retro-areolar and axillary portions of both breasts were unremarkable. Patient stated that she had no issues for quite some time, until 2015, when

## 2024-09-04 ENCOUNTER — HOSPITAL ENCOUNTER (OUTPATIENT)
Dept: MAMMOGRAPHY | Age: 59
Discharge: HOME | End: 2024-09-07
Payer: COMMERCIAL

## 2024-09-04 DIAGNOSIS — Z91.89 AT RISK FOR BONE DENSITY LOSS: ICD-10-CM

## 2024-09-04 PROCEDURE — 77080 DXA BONE DENSITY AXIAL: CPT

## 2024-10-23 DIAGNOSIS — C50.919 RECURRENT MALIGNANT NEOPLASM OF BREAST, UNSPECIFIED LATERALITY (HCC): Primary | ICD-10-CM

## 2024-10-23 DIAGNOSIS — Z91.89 AT RISK FOR BONE DENSITY LOSS: ICD-10-CM

## 2024-11-04 ASSESSMENT — ENCOUNTER SYMPTOMS
VOMITING: 0
ABDOMINAL DISTENTION: 0
WHEEZING: 0
VOICE CHANGE: 0
NAUSEA: 0
SHORTNESS OF BREATH: 0
SCLERAL ICTERUS: 0
DIARRHEA: 0
CONSTIPATION: 0
HEMOPTYSIS: 0
SORE THROAT: 0
TROUBLE SWALLOWING: 0
CHEST TIGHTNESS: 0
BLOOD IN STOOL: 0
ABDOMINAL PAIN: 0

## 2024-11-04 NOTE — PROGRESS NOTES
not interested in either of those recommendations and opted not to continue her care with this group. She was looking for a provider with a more holistic approach and was referred to Dr. Fu.  She was evaluated in consultation by Dr. Fu on 4/2/19 to establish care as a new patient due to a history of breast cancer.  Patient was interested in moving forward with the possibility of an alternative AI.  During follow-up appointment with Dr. Fu on 10/16/19, patient opted to proceed with letrozole 2.5mg po daily.  She was also referred to a naturopathic oncology provider.  Ultimately, patient did not end up starting the letrozole.  She was last seen by Dr. Fu on 11/17/20.  Patient has since relocated from [place] to [place] and is seeking to establish oncology care with [facility] for her history of breast cancer.     - During consultation, we discussed the pathophysiology of breast cancer, staging, and the importance of receptor status in terms of treatment options.  We then reviewed her medical history as well as oncologic history, recent imaging and pathology in detail.  We discussed next steps in management.  She was here with her .    - follow-up on letrozole. She continues to do well. She had the Carrie Mariama procedure in November and has continued to struggle with vaginal dryness. She was started on Estrace vaginal cream and we discussed the risk of using this with letrozole. She reports that she has not been taking her Letrozole which places her at higher risk since she is not receiving endocrine therapy. She will decrease the use of cream from twice a week for 1/2 a dose per week. I will FU with her concerning this risk. She has been on endo therapy about 8 years. She has no other concerns. Her dexa is due in September; she is on vitamin D supplementation. VS and labs reviewed and stable. She will return in 6 months for FU, or sooner if needed.     - 11/8/24 - today, pt is here for

## 2024-11-08 ENCOUNTER — HOSPITAL ENCOUNTER (OUTPATIENT)
Dept: LAB | Age: 59
Discharge: HOME | End: 2024-11-08
Payer: COMMERCIAL

## 2024-11-08 ENCOUNTER — OFFICE VISIT (OUTPATIENT)
Dept: ONCOLOGY | Age: 59
End: 2024-11-08
Payer: COMMERCIAL

## 2024-11-08 VITALS
SYSTOLIC BLOOD PRESSURE: 122 MMHG | TEMPERATURE: 97.3 F | OXYGEN SATURATION: 99 % | HEART RATE: 82 BPM | WEIGHT: 124 LBS | HEIGHT: 65 IN | BODY MASS INDEX: 20.66 KG/M2 | DIASTOLIC BLOOD PRESSURE: 85 MMHG | RESPIRATION RATE: 16 BRPM

## 2024-11-08 DIAGNOSIS — Z91.89 AT RISK FOR BONE DENSITY LOSS: ICD-10-CM

## 2024-11-08 DIAGNOSIS — Z90.13 H/O BILATERAL MASTECTOMY: ICD-10-CM

## 2024-11-08 DIAGNOSIS — Z12.39 BREAST CANCER SCREENING, HIGH RISK PATIENT: ICD-10-CM

## 2024-11-08 DIAGNOSIS — C50.919 RECURRENT MALIGNANT NEOPLASM OF BREAST, UNSPECIFIED LATERALITY (HCC): ICD-10-CM

## 2024-11-08 DIAGNOSIS — E03.9 ACQUIRED HYPOTHYROIDISM: ICD-10-CM

## 2024-11-08 DIAGNOSIS — Z98.890 S/P BREAST RECONSTRUCTION: ICD-10-CM

## 2024-11-08 DIAGNOSIS — E55.9 VITAMIN D DEFICIENCY: ICD-10-CM

## 2024-11-08 DIAGNOSIS — C50.919 RECURRENT MALIGNANT NEOPLASM OF BREAST, UNSPECIFIED LATERALITY (HCC): Primary | ICD-10-CM

## 2024-11-08 DIAGNOSIS — N64.59 ABNORMAL BREAST EXAM: ICD-10-CM

## 2024-11-08 PROBLEM — Z79.811 AROMATASE INHIBITOR USE: Status: RESOLVED | Noted: 2022-11-02 | Resolved: 2024-11-08

## 2024-11-08 LAB
25(OH)D3 SERPL-MCNC: 47.9 NG/ML (ref 30–100)
ALBUMIN SERPL-MCNC: 4.4 G/DL (ref 3.2–4.6)
ALBUMIN/GLOB SERPL: 1.6 (ref 1–1.9)
ALP SERPL-CCNC: 64 U/L (ref 35–104)
ALT SERPL-CCNC: 12 U/L (ref 8–45)
ANION GAP SERPL CALC-SCNC: 10 MMOL/L (ref 7–16)
AST SERPL-CCNC: 27 U/L (ref 15–37)
BASOPHILS # BLD: 0.1 K/UL (ref 0–0.2)
BASOPHILS NFR BLD: 1 % (ref 0–2)
BILIRUB SERPL-MCNC: 0.5 MG/DL (ref 0–1.2)
BUN SERPL-MCNC: 18 MG/DL (ref 8–23)
CALCIUM SERPL-MCNC: 9.2 MG/DL (ref 8.8–10.2)
CHLORIDE SERPL-SCNC: 100 MMOL/L (ref 98–107)
CO2 SERPL-SCNC: 26 MMOL/L (ref 20–29)
CREAT SERPL-MCNC: 0.84 MG/DL (ref 0.6–1.1)
DIFFERENTIAL METHOD BLD: ABNORMAL
EOSINOPHIL # BLD: 0 K/UL (ref 0–0.8)
EOSINOPHIL NFR BLD: 1 % (ref 0.5–7.8)
ERYTHROCYTE [DISTWIDTH] IN BLOOD BY AUTOMATED COUNT: 12.9 % (ref 11.9–14.6)
GLOBULIN SER CALC-MCNC: 2.7 G/DL (ref 2.3–3.5)
GLUCOSE SERPL-MCNC: 100 MG/DL (ref 70–99)
HCT VFR BLD AUTO: 39.1 % (ref 35.8–46.3)
HGB BLD-MCNC: 12.7 G/DL (ref 11.7–15.4)
IMM GRANULOCYTES # BLD AUTO: 0 K/UL (ref 0–0.5)
IMM GRANULOCYTES NFR BLD AUTO: 0 % (ref 0–5)
LYMPHOCYTES # BLD: 1.6 K/UL (ref 0.5–4.6)
LYMPHOCYTES NFR BLD: 25 % (ref 13–44)
MCH RBC QN AUTO: 29.9 PG (ref 26.1–32.9)
MCHC RBC AUTO-ENTMCNC: 32.5 G/DL (ref 31.4–35)
MCV RBC AUTO: 92 FL (ref 82–102)
MONOCYTES # BLD: 0.5 K/UL (ref 0.1–1.3)
MONOCYTES NFR BLD: 8 % (ref 4–12)
NEUTS SEG # BLD: 4.2 K/UL (ref 1.7–8.2)
NEUTS SEG NFR BLD: 65 % (ref 43–78)
NRBC # BLD: 0 K/UL (ref 0–0.2)
PLATELET # BLD AUTO: 329 K/UL (ref 150–450)
PMV BLD AUTO: 9 FL (ref 9.4–12.3)
POTASSIUM SERPL-SCNC: 4.2 MMOL/L (ref 3.5–5.1)
PROT SERPL-MCNC: 7 G/DL (ref 6.3–8.2)
RBC # BLD AUTO: 4.25 M/UL (ref 4.05–5.2)
SODIUM SERPL-SCNC: 136 MMOL/L (ref 136–145)
TSH, 3RD GENERATION: 0.58 UIU/ML (ref 0.27–4.2)
WBC # BLD AUTO: 6.4 K/UL (ref 4.3–11.1)

## 2024-11-08 PROCEDURE — 99214 OFFICE O/P EST MOD 30 MIN: CPT | Performed by: INTERNAL MEDICINE

## 2024-11-08 PROCEDURE — 82306 VITAMIN D 25 HYDROXY: CPT

## 2024-11-08 PROCEDURE — 80053 COMPREHEN METABOLIC PANEL: CPT

## 2024-11-08 PROCEDURE — 84443 ASSAY THYROID STIM HORMONE: CPT

## 2024-11-08 PROCEDURE — 36415 COLL VENOUS BLD VENIPUNCTURE: CPT

## 2024-11-08 PROCEDURE — 85025 COMPLETE CBC W/AUTO DIFF WBC: CPT

## 2024-11-08 ASSESSMENT — PATIENT HEALTH QUESTIONNAIRE - PHQ9
2. FEELING DOWN, DEPRESSED OR HOPELESS: NOT AT ALL
1. LITTLE INTEREST OR PLEASURE IN DOING THINGS: NOT AT ALL
SUM OF ALL RESPONSES TO PHQ QUESTIONS 1-9: 0
SUM OF ALL RESPONSES TO PHQ9 QUESTIONS 1 & 2: 0
SUM OF ALL RESPONSES TO PHQ QUESTIONS 1-9: 0

## 2024-11-08 NOTE — PATIENT INSTRUCTIONS
Patient Information from Today's Visit    The members of your [facility] are listed below:    Physician Provider: Krysta Martinez, Medical Oncologist  Advanced Practice Clinician: Brittney Beltrán NP  Registered Nurse: Brisa ANTONY RN  Navigator: Meenu CORADO RN  Medical Assistant: Tammie WICK MA  : Noemí MYLES   Supportive Care Services: Jaun BINGHAM LMSW    Diagnosis: Breast      Follow Up Instructions: 6-8 months.    Labs reviewed.  Symptoms reviewed.  Breast MRI recommended.  You can call to schedule this at [phone].    Treatment Summary has been discussed and given to patient:N/A      Current Labs:   Hospital Outpatient Visit on 11/08/2024   Component Date Value Ref Range Status    WBC 11/08/2024 6.4  4.3 - 11.1 K/uL Final    RBC 11/08/2024 4.25  4.05 - 5.2 M/uL Final    Hemoglobin 11/08/2024 12.7  11.7 - 15.4 g/dL Final    Hematocrit 11/08/2024 39.1  35.8 - 46.3 % Final    MCV 11/08/2024 92.0  82.0 - 102.0 FL Final    MCH 11/08/2024 29.9  26.1 - 32.9 PG Final    MCHC 11/08/2024 32.5  31.4 - 35.0 g/dL Final    RDW 11/08/2024 12.9  11.9 - 14.6 % Final    Platelets 11/08/2024 329  150 - 450 K/uL Final    MPV 11/08/2024 9.0 (L)  9.4 - 12.3 FL Final    nRBC 11/08/2024 0.00  0.0 - 0.2 K/uL Final    **Note: Absolute NRBC parameter is now reported with Hemogram**    Neutrophils % 11/08/2024 65  43 - 78 % Final    Lymphocytes % 11/08/2024 25  13 - 44 % Final    Monocytes % 11/08/2024 8  4.0 - 12.0 % Final    Eosinophils % 11/08/2024 1  0.5 - 7.8 % Final    Basophils % 11/08/2024 1  0.0 - 2.0 % Final    Immature Granulocytes % 11/08/2024 0  0.0 - 5.0 % Final    Neutrophils Absolute 11/08/2024 4.2  1.7 - 8.2 K/UL Final    Lymphocytes Absolute 11/08/2024 1.6  0.5 - 4.6 K/UL Final    Monocytes Absolute 11/08/2024 0.5  0.1 - 1.3 K/UL Final    Eosinophils Absolute 11/08/2024 0.0  0.0 - 0.8 K/UL Final    Basophils Absolute 11/08/2024 0.1  0.0 - 0.2 K/UL Final    Immature Granulocytes Absolute 11/08/2024

## 2024-11-11 DIAGNOSIS — F40.240 CLAUSTROPHOBIA: Primary | ICD-10-CM

## 2024-11-12 RX ORDER — DIAZEPAM 2 MG/1
2 TABLET ORAL ONCE
Qty: 1 TABLET | Refills: 0 | Status: SHIPPED | OUTPATIENT
Start: 2024-11-12 | End: 2024-11-12

## 2024-11-26 ENCOUNTER — HOSPITAL ENCOUNTER (OUTPATIENT)
Dept: MRI IMAGING | Age: 59
Discharge: HOME | End: 2024-11-29
Attending: INTERNAL MEDICINE
Payer: COMMERCIAL

## 2024-11-26 DIAGNOSIS — Z98.890 S/P BREAST RECONSTRUCTION: ICD-10-CM

## 2024-11-26 DIAGNOSIS — Z12.39 BREAST CANCER SCREENING, HIGH RISK PATIENT: ICD-10-CM

## 2024-11-26 DIAGNOSIS — N64.59 ABNORMAL BREAST EXAM: ICD-10-CM

## 2024-11-26 PROCEDURE — C8908 MRI W/O FOL W/CONT, BREAST,: HCPCS

## 2024-11-26 PROCEDURE — 6360000004 HC RX CONTRAST MEDICATION: Performed by: INTERNAL MEDICINE

## 2024-11-26 PROCEDURE — A9579 GAD-BASE MR CONTRAST NOS,1ML: HCPCS | Performed by: INTERNAL MEDICINE

## 2024-11-26 RX ADMIN — GADOTERIDOL 10 ML: 279.3 INJECTION, SOLUTION INTRAVENOUS at 08:16

## 2024-12-08 PROBLEM — Z12.39 BREAST CANCER SCREENING, HIGH RISK PATIENT: Status: RESOLVED | Noted: 2024-11-08 | Resolved: 2024-12-08

## 2025-01-08 ENCOUNTER — TELEPHONE (OUTPATIENT)
Dept: RADIATION ONCOLOGY | Age: 60
End: 2025-01-08

## 2025-01-08 NOTE — TELEPHONE ENCOUNTER
Per patient, insurance is denying MRI of breast in November.  Patient is uploading denial letter in [employer].  Informed will forward to Benefits Department.

## 2025-01-08 NOTE — TELEPHONE ENCOUNTER
Physician provider: Dr. Martinez  Reason for today's call (Please detail here patients chief complaint): Pt states mri was denied and request a call back.   Last office visit:na  Patient Callback Number: [phone]  Was callback number verified?: Yes  Preferred pharmacy (If refill request): na  Calls to office within the last 48 hours?:No    Patient notified that their information will be routed to the [facility] clinical triage team for review. Patient is advised that they will receive a phone call from the triage department. If symptom related and symptoms worsen before receiving a call back, the patient has been advised to proceed to the nearest ED.

## 2025-04-25 ENCOUNTER — CLINICAL DOCUMENTATION (OUTPATIENT)
Dept: ONCOLOGY | Age: 60
End: 2025-04-25

## 2025-04-25 NOTE — PROGRESS NOTES
4/24/25  Call placed to [employer] at #[phone].  Spoke with representative, Alvarado.  Inquired about submitting a post-service appeal for a denied imaging service.  Per Alvarado, the appeal request for the denied Bilateral Breast MRI that was completed on DOS 11/26/24 should [facility] [place] [facility]  Alvarado provided the denied authorization # of [id number] and the case tracking # [id number] and advised that the request and supporting documentation should be faxed to #[phone]. Total call time: 17 minutes    4/25/25 Appeal letter for denied CPT Code 96415 Bilateral MRI of Breast with and without contrast for DOS 11/26/24 composed and faxed to [employer] [facility] Attn:  Clinical Appeals at fax #[phone] along with the following documents:    Oncology office visit note dated November 8, 2024  Bilateral Breast MRI report dated November 26, 2024  Mammogram report dated June 12, 2014  Copy of the denial letter    Proficient Transaction ID [id number]

## 2025-06-11 ENCOUNTER — OFFICE VISIT (OUTPATIENT)
Dept: ONCOLOGY | Age: 60
End: 2025-06-11
Payer: COMMERCIAL

## 2025-06-11 ENCOUNTER — TELEPHONE (OUTPATIENT)
Dept: ONCOLOGY | Age: 60
End: 2025-06-11

## 2025-06-11 VITALS
OXYGEN SATURATION: 100 % | TEMPERATURE: 97.2 F | RESPIRATION RATE: 16 BRPM | DIASTOLIC BLOOD PRESSURE: 80 MMHG | HEART RATE: 82 BPM | HEIGHT: 65 IN | BODY MASS INDEX: 20.64 KG/M2 | SYSTOLIC BLOOD PRESSURE: 122 MMHG | WEIGHT: 123.9 LBS

## 2025-06-11 DIAGNOSIS — Z90.13 H/O BILATERAL MASTECTOMY: ICD-10-CM

## 2025-06-11 DIAGNOSIS — Z98.890 S/P BREAST RECONSTRUCTION: ICD-10-CM

## 2025-06-11 DIAGNOSIS — C50.919 RECURRENT MALIGNANT NEOPLASM OF BREAST, UNSPECIFIED LATERALITY (HCC): Primary | ICD-10-CM

## 2025-06-11 PROCEDURE — 99214 OFFICE O/P EST MOD 30 MIN: CPT | Performed by: NURSE PRACTITIONER

## 2025-06-11 RX ORDER — LEVOTHYROXINE SODIUM 75 UG/1
TABLET ORAL
COMMUNITY
Start: 2025-05-28

## 2025-06-11 ASSESSMENT — ENCOUNTER SYMPTOMS
CHEST TIGHTNESS: 0
NAUSEA: 0
ABDOMINAL DISTENTION: 0
ABDOMINAL PAIN: 0
CONSTIPATION: 0
DIARRHEA: 0
VOMITING: 0
TROUBLE SWALLOWING: 0
BLOOD IN STOOL: 0
VOICE CHANGE: 0
WHEEZING: 0
SHORTNESS OF BREATH: 0
HEMOPTYSIS: 0
SCLERAL ICTERUS: 0
SORE THROAT: 0

## 2025-06-11 ASSESSMENT — PATIENT HEALTH QUESTIONNAIRE - PHQ9
2. FEELING DOWN, DEPRESSED OR HOPELESS: NOT AT ALL
SUM OF ALL RESPONSES TO PHQ QUESTIONS 1-9: 0
SUM OF ALL RESPONSES TO PHQ QUESTIONS 1-9: 0
1. LITTLE INTEREST OR PLEASURE IN DOING THINGS: NOT AT ALL
SUM OF ALL RESPONSES TO PHQ QUESTIONS 1-9: 0
SUM OF ALL RESPONSES TO PHQ QUESTIONS 1-9: 0

## 2025-06-11 NOTE — PROGRESS NOTES
she again self-palpated a lump in her right breast. Lumpectomy was done on 12/14/2018, and pathology noted invasive ductal carcinoma, 0.6 cm, grade 2, ER/SC +, HER2 -. She opted to procced with a skin sparing bilateral mastectomy, which was done 01/21/2016. The left breast noted fibrocystic changes, negative for atypia or malignancy. The right breast noting extensive previous biopsy site and reactive changes, negative for residual invasive carcinoma. Three lymph nodes were removed from the right axilla, and all three nodes were negative for carcinoma. Staging was [id number]. Oncotype DX was not done. BRCA testing was negative.      The patient did establish care with [facility] on 11/11/2016.  She was restarted on Tamoxifen, which was changed to Anastrazole in February of 2018, due to menopause status.  The patient states that she was on AI therapy for about 3 months, but she was unable to handle the side effects of the medication. She did try cutting the medication dose in half, which was better, but she still opted to discontinue the medication.  She does however take a hormone balancing supplement with DIM.  PET/CT scan done on 11/30/2016 noted a vague area of hypermetabolism within the right breast subareolar region with subtle soft tissue fullness when compared to the contralateral side; post-surgical changes of bilateral mastectomy, bilateral axillary lymph node dissection, and bilateral breast reconstruction; no suspicious hypermetabolic lymphadenopathy of the head and neck, chest, abdomen or pelvis; and no suspicious hypermetabolic osseous lesion. Bone density was performed on 03/14/2018, noting osteopenia.  Patient does take daily calcium and vitamin D, but she has declined any bisphosphonate therapy.      She was seen by her oncologist with [facility] in October of 2018. They discussed the continuation of AI therapy, along with consideration of bisphosphonate therapy. The patient was

## 2025-06-11 NOTE — TELEPHONE ENCOUNTER
Physician provider: Dr. Martinez  Reason for today's call (Please detail here patients chief complaint): Denial of Breast MRI  Last office visit:na  Patient Callback Number: [phone]  Was callback number verified?: Yes  Preferred pharmacy (If refill request): na  Veriified that patient confirmed no refills left at pharmacy? :No  Calls to office within the last 48 hours?:No    Warm Transfer to (For [employer] Words): na    Patient notified that their information will be routed to the [facility] clinical triage team for review. Patient is advised that they will receive a phone call from the triage department. If symptom related and symptoms worsen before receiving a call back, the patient has been advised to proceed to the nearest ED.      Pt's Breast MRI that was completed in November of 2024 was denied. The appeal response came through recently and is also denied. Pt is asking that this be addressed so she does not receive a big, unexpected bill.